# Patient Record
Sex: FEMALE | Race: WHITE | NOT HISPANIC OR LATINO | Employment: UNEMPLOYED | URBAN - METROPOLITAN AREA
[De-identification: names, ages, dates, MRNs, and addresses within clinical notes are randomized per-mention and may not be internally consistent; named-entity substitution may affect disease eponyms.]

---

## 2018-12-20 ENCOUNTER — HISTORICAL (OUTPATIENT)
Dept: INTENSIVE CARE | Facility: HOSPITAL | Age: 23
End: 2018-12-20

## 2019-01-04 ENCOUNTER — HISTORICAL (OUTPATIENT)
Dept: RADIOLOGY | Facility: HOSPITAL | Age: 24
End: 2019-01-04

## 2021-11-10 ENCOUNTER — TELEPHONE (OUTPATIENT)
Dept: PEDIATRIC CARDIOLOGY | Facility: CLINIC | Age: 26
End: 2021-11-10
Payer: MEDICAID

## 2021-11-10 ENCOUNTER — DOCUMENTATION ONLY (OUTPATIENT)
Dept: PEDIATRIC CARDIOLOGY | Facility: CLINIC | Age: 26
End: 2021-11-10

## 2021-11-10 DIAGNOSIS — Z95.2 H/O HEART VALVE REPLACEMENT WITH MECHANICAL VALVE: ICD-10-CM

## 2021-11-10 DIAGNOSIS — I05.0 MITRAL VALVE STENOSIS, UNSPECIFIED ETIOLOGY: Primary | ICD-10-CM

## 2021-11-10 DIAGNOSIS — Z87.74 H/O AORTIC COARCTATION REPAIR: ICD-10-CM

## 2021-11-10 DIAGNOSIS — Q24.9 CONGENITAL MALFORMATION OF HEART, UNSPECIFIED: ICD-10-CM

## 2022-01-26 NOTE — PROGRESS NOTES
2022     re:Carlie Marcelino  :1995    Lindsay Regalado, GIANA  [address] [facility] [address]    [facility]     Dear Dr. Goodson and Ms. Regalado:    Carlie Marcelino is a 26 y.o. female seen in my ACHD clinic today for evaluation of congenital heart disease.  To summarize her diagnoses are as follow:  1. Coarctation of the aorta status post repair 2000  - most recent catheterization 2014  - no significant obstruction noted across coarctation repair  2. Status post resection of a supravalvar mitral ring and Primum atrial septal defect closed 2001  - no residual obstruction within the left atrium, no evidence of residual intracardiac shunting  3. Severe subaortic stenosis treated with a modified Konno procedure May 2004 followed by VSD closure 2004  - no left ventricular outflow obstruction and no significant aortic valve insufficiency  4. Severe mitral valve regurgitation necessitating mitral valve replacement with a 23 mm Saint Keith mechanical valve 2010  - possible mild to moderate stenosis of the mechanical valve with peak and mean gradient across the valve as high as 21 and 12 mmHg, respectively.  - mild to moderate tricuspid insufficiency estimating right ventricular and pulmonary arterial systolic pressure around 45-50 mmHg.  - currently enrolled in [facility]madin Clinic at [facility] [place]  5. Reduced left ventricular systolic function primarily due to VSD patch  - left ventricular ejection fraction around 42% with motion of the myocardium   5. History of wide complex tachycardia/SVT   6. Distant history of seizures, last seen by Neurology at 10 years of age.    7. Pectus carinatum.  All surgeries performed at [facility].      To summarize, my recommendations are as follows:  1. Continue Coumadin with goal INR 2.5-3.5.  2. I have called the [facility] in  [place] to make sure I can continue to have her INR level drawn there if I am the primary cardiologist.  If there are any issues, we can get her transferred over to our Coumadin Clinic, but I am absolutely fine with her continuing care there.  3. Schedule for a diagnostic catheterization.  Specific question to answer is the right ventricular and pulmonary arterial pressure and left ventricular end-diastolic pressure.  We can assess for mitral stenosis.  4. Continue enalapril and metoprolol for now.  5. SBE prophylaxis is definitely indicated.  6. Patient would like to transfer her care to my Adult Congenital Heart Disease Clinic.  I can see her in [place].    Discussion:  1. We discussed her left ventricular function.  Although the ejection fraction is about 42% by my measurement, this is mostly secondary to the expected lack of motion of her ventricular septal defect patch.  The rest of the myocardium moves well.  She is asymptomatic.  There is certainly no indication for a heart transplant at present.  2. I do have concerns about her mitral valve.  The velocity across that valve is higher than expected, and the echo suggests some elevation of her right ventricular and pulmonary arterial pressures.  A catheterization, possibly with a transesophageal echocardiogram although I am not sure this is necessary, should help discern whether there is a significant gradient across that valve.  She could potentially need her mitral valve replaced.  3. Her left ventricular outflow tract, aortic valve, and coarctation looked great.  4. We discussed pregnancy.  I agree that she is a high risk pregnancy given her mechanical valve and high-dose Coumadin.      History of present illness:   She was last seen by Dr. Goodson, who referred her to Adult Congenital Heart Disease Clinic, November 3, 2021. At that time, she was asymptomatic with no further episodes of SVT reported.  She was taking metoprolol 25 mg twice a day,  enalapril 2.5 mg twice a day, and Coumadin.  Her height was 4 ft 10 with weight 102 lb.  Blood pressure was 120/77.  Saturation 99%.  A grade 2/6 systolic ejection murmur was followed by grade 1/6 diastolic murmur.  Femoral pulses were normal.  An EKG revealed normal sinus rhythm with a right bundle branch block and left anterior fascicular block.  An echocardiogram revealed very mild flow acceleration across the aortic arch with peak and mean gradients 14 and 5 mmHg, respectively.  Peak and mean gradient of 22 and 11 across the mitral valve with mild mitral insufficiency.  No evidence of pulmonary hypertension.  No intracardiac shunting.  Left ventricular end-diastolic dimension 5.4 cm with ejection fraction reduced at 40%.  He did note, however, that her transthoracic windows were not optimal, and he thought a cardiac catheterization and possibly a transesophageal echocardiogram would be helpful.  Our congenital cardiac catheterization team, represented by Dr. Funes, recommended an initial visit in my clinic with a cardiac MRI.    She is a very talkative and engaging woman.  She had absolutely no complaints.  She denies chest pain, dyspnea on exertion, shortness of breath, palpitations, syncope, near syncope, cyanosis, and edema.  She is concerned because she was told she may need a heart transplant given her left ventricular function.  She was also told she may need her mitral valve replaced.    The review of systems is as noted above. It is otherwise negative for other symptoms related to the general, neurological, psychiatric, endocrine, gastrointestinal, genitourinary, respiratory, dermatologic, musculoskeletal, hematologic, and immunologic systems.    Past Medical History:   Diagnosis Date    Aortic stenosis     Coarctation of aorta     Mitral valve stenosis      Past Surgical History:   Procedure Laterality Date    CARDIAC SURGERY      MITRAL VALVE REPLACEMENT      REPAIR OF COARCTATION OF AORTA    "    No family history on file.  Social History     Socioeconomic History    Marital status: Single     Current Outpatient Medications on File Prior to Visit   Medication Sig Dispense Refill    enalapril (VASOTEC) 2.5 MG tablet Take by mouth.      JANTOVEN 6 mg tablet Take by mouth.      metoprolol tartrate (LOPRESSOR) 25 MG tablet Take 25 mg by mouth.       Current Facility-Administered Medications on File Prior to Visit   Medication Dose Route Frequency Provider Last Rate Last Admin    [COMPLETED] gadobenate dimeglumine (MULTIHANCE) injection 20 mL  20 mL Intravenous ONCE PRN Aguilar Olguin MD   20 mL at 01/27/22 0956     Review of patient's allergies indicates:  No Known Allergies     Vitals:    01/27/22 1207 01/27/22 1208   BP: (!) 116/58 134/63   BP Location: Right arm Left leg   Pulse: 65    SpO2: 99%    Weight: 47.6 kg (104 lb 15 oz)    Height: 4' 11" (1.499 m)      In general, she is a small, very pleasant, very healthy-appearing nondysmorphic female in no apparent distress.  The eyes, nares, and oropharynx are clear.  Eyelids and conjunctiva are normal without drainage or erythema.  Pupils equal and round bilaterally.  The head is normocephalic and atraumatic.  The neck is supple without jugular venous distention or thyroid enlargement.  The lungs are clear to auscultation bilaterally.  There is a well-healed sternotomy incision.  A mechanical 1st heart sound is normal.  The 2nd heart sound is split.  A grade 1/6 systolic ejection murmur is heard at the upper sternal border.  I do hear a grade 1-2/6 blowing diastolic murmur at the apex.  The abdominal exam is benign without hepatosplenomegaly, tenderness, or distention.  Pulses are normal in all 4 extremities with brisk capillary refill and no clubbing, cyanosis, or edema.  No rashes are noted.    I personally reviewed the following tests performed today and my interpretation follows:  An EKG performed in clinic today reveals a superior leftward axis. "  Sinus rhythm noted.  Right bundle branch block present with QRS duration about 150 milliseconds.    The official echo report is pending.  I reviewed that study in detail.  My interpretation is as follows:  1. Left ventricular ejection fraction around 42%.  However, when I measure the ejection fraction in views not including the VSD patch, I get about 60%.  2. Unobstructive left ventricular outflow tract.  No aortic root enlargement.  No significant aortic insufficiency.  3. Mechanical mitral valve in place.  No significant insufficiency.  Peak velocity about 2.3 m/sec with peak and mean gradients 21 and 12 mmHg, respectively.  4. Mild to moderate somewhat central jet of tricuspid insufficiency estimates the right ventricular systolic pressure about 39 mmHg greater than the right atrial pressure.  5. Normal right ventricular function.  6. Well repaired coarctation.  Mild flow acceleration without discrete narrowing noted across the coarctation with peak and mean gradients 22 and 12 mmHg, respectively.  No diastolic runoff.    The cardiac MRI is pending.  By report, the coarctation site looks good.      Thank you for referring this patient to our clinic.  Please call with any questions.    Sincerely,        Aguilar Olguin MD  Pediatric Cardiology  Adult Congenital Heart Disease  Pediatric Heart Failure and Transplantation  [facility] [address] [place]  [address]  [phone]

## 2022-01-27 ENCOUNTER — OFFICE VISIT (OUTPATIENT)
Dept: CARDIOLOGY | Facility: CLINIC | Age: 27
End: 2022-01-27
Payer: MEDICAID

## 2022-01-27 ENCOUNTER — HOSPITAL ENCOUNTER (OUTPATIENT)
Dept: CARDIOLOGY | Facility: HOSPITAL | Age: 27
Discharge: HOME | End: 2022-01-27
Attending: PEDIATRICS
Payer: MEDICAID

## 2022-01-27 ENCOUNTER — HOSPITAL ENCOUNTER (OUTPATIENT)
Dept: CARDIOLOGY | Facility: CLINIC | Age: 27
Discharge: HOME | End: 2022-01-27
Payer: MEDICAID

## 2022-01-27 ENCOUNTER — HOSPITAL ENCOUNTER (OUTPATIENT)
Dept: RADIOLOGY | Facility: HOSPITAL | Age: 27
Discharge: HOME | End: 2022-01-27
Attending: PEDIATRICS
Payer: MEDICAID

## 2022-01-27 VITALS
HEART RATE: 65 BPM | DIASTOLIC BLOOD PRESSURE: 63 MMHG | SYSTOLIC BLOOD PRESSURE: 134 MMHG | OXYGEN SATURATION: 99 % | HEIGHT: 59 IN | WEIGHT: 104.94 LBS | BODY MASS INDEX: 21.16 KG/M2

## 2022-01-27 VITALS — WEIGHT: 105 LBS | HEIGHT: 59 IN | BODY MASS INDEX: 21.17 KG/M2

## 2022-01-27 DIAGNOSIS — Z95.2 H/O HEART VALVE REPLACEMENT WITH MECHANICAL VALVE: ICD-10-CM

## 2022-01-27 DIAGNOSIS — Q24.9 CONGENITAL MALFORMATION OF HEART, UNSPECIFIED: ICD-10-CM

## 2022-01-27 DIAGNOSIS — I05.0 MITRAL VALVE STENOSIS, UNSPECIFIED ETIOLOGY: ICD-10-CM

## 2022-01-27 DIAGNOSIS — Z87.74 H/O AORTIC COARCTATION REPAIR: ICD-10-CM

## 2022-01-27 DIAGNOSIS — I50.22 CHRONIC SYSTOLIC HEART FAILURE: ICD-10-CM

## 2022-01-27 DIAGNOSIS — Z95.2 H/O MITRAL VALVE REPLACEMENT WITH MECHANICAL VALVE: ICD-10-CM

## 2022-01-27 DIAGNOSIS — Q24.9 ADULT CONGENITAL HEART DISEASE: ICD-10-CM

## 2022-01-27 DIAGNOSIS — Q24.4 SUBAORTIC STENOSIS: ICD-10-CM

## 2022-01-27 PROCEDURE — 25500020 PHARM REV CODE 255: Performed by: PEDIATRICS

## 2022-01-27 PROCEDURE — 93303 ECHO (CUPID ONLY): ICD-10-PCS | Mod: 26,,, | Performed by: PEDIATRICS

## 2022-01-27 PROCEDURE — 3075F PR MOST RECENT SYSTOLIC BLOOD PRESS GE 130-139MM HG: ICD-10-PCS | Mod: CPTII,,, | Performed by: PEDIATRICS

## 2022-01-27 PROCEDURE — 93320 ECHO (CUPID ONLY): ICD-10-PCS | Mod: 26,,, | Performed by: PEDIATRICS

## 2022-01-27 PROCEDURE — 99999 PR PBB SHADOW E&M-EST. PATIENT-LVL III: ICD-10-PCS | Mod: PBBFAC,,, | Performed by: PEDIATRICS

## 2022-01-27 PROCEDURE — 1159F PR MEDICATION LIST DOCUMENTED IN MEDICAL RECORD: ICD-10-PCS | Mod: CPTII,,, | Performed by: PEDIATRICS

## 2022-01-27 PROCEDURE — 93010 ELECTROCARDIOGRAM REPORT: CPT | Mod: S$PBB,,, | Performed by: INTERNAL MEDICINE

## 2022-01-27 PROCEDURE — A9577 INJ MULTIHANCE: HCPCS | Performed by: PEDIATRICS

## 2022-01-27 PROCEDURE — 99999 PR PBB SHADOW E&M-EST. PATIENT-LVL III: CPT | Mod: PBBFAC,,, | Performed by: PEDIATRICS

## 2022-01-27 PROCEDURE — 3008F BODY MASS INDEX DOCD: CPT | Mod: CPTII,,, | Performed by: PEDIATRICS

## 2022-01-27 PROCEDURE — 93303 ECHO TRANSTHORACIC: CPT | Mod: 26,,, | Performed by: PEDIATRICS

## 2022-01-27 PROCEDURE — 1159F MED LIST DOCD IN RCRD: CPT | Mod: CPTII,,, | Performed by: PEDIATRICS

## 2022-01-27 PROCEDURE — 3078F PR MOST RECENT DIASTOLIC BLOOD PRESSURE < 80 MM HG: ICD-10-PCS | Mod: CPTII,,, | Performed by: PEDIATRICS

## 2022-01-27 PROCEDURE — 93325 DOPPLER ECHO COLOR FLOW MAPG: CPT

## 2022-01-27 PROCEDURE — 93320 DOPPLER ECHO COMPLETE: CPT | Mod: 26,,, | Performed by: PEDIATRICS

## 2022-01-27 PROCEDURE — 99205 PR OFFICE/OUTPT VISIT, NEW, LEVL V, 60-74 MIN: ICD-10-PCS | Mod: 25,S$PBB,, | Performed by: PEDIATRICS

## 2022-01-27 PROCEDURE — 93325 ECHO (CUPID ONLY): ICD-10-PCS | Mod: 26,,, | Performed by: PEDIATRICS

## 2022-01-27 PROCEDURE — 3008F PR BODY MASS INDEX (BMI) DOCUMENTED: ICD-10-PCS | Mod: CPTII,,, | Performed by: PEDIATRICS

## 2022-01-27 PROCEDURE — 75561 CV CARDIAC MRI MORPHOLOGY (CUPID ONLY): ICD-10-PCS | Mod: 26,,, | Performed by: PEDIATRICS

## 2022-01-27 PROCEDURE — 75561 CARDIAC MRI FOR MORPH W/DYE: CPT | Mod: 26,,, | Performed by: PEDIATRICS

## 2022-01-27 PROCEDURE — 75561 MRI CARDIAC MORPHOLOGY FUNCTION W WO: ICD-10-PCS | Mod: 26,,, | Performed by: INTERNAL MEDICINE

## 2022-01-27 PROCEDURE — 93010 EKG 12-LEAD: ICD-10-PCS | Mod: S$PBB,,, | Performed by: INTERNAL MEDICINE

## 2022-01-27 PROCEDURE — 3078F DIAST BP <80 MM HG: CPT | Mod: CPTII,,, | Performed by: PEDIATRICS

## 2022-01-27 PROCEDURE — 93005 ELECTROCARDIOGRAM TRACING: CPT | Mod: PBBFAC | Performed by: INTERNAL MEDICINE

## 2022-01-27 PROCEDURE — 3075F SYST BP GE 130 - 139MM HG: CPT | Mod: CPTII,,, | Performed by: PEDIATRICS

## 2022-01-27 PROCEDURE — 99205 OFFICE O/P NEW HI 60 MIN: CPT | Mod: 25,S$PBB,, | Performed by: PEDIATRICS

## 2022-01-27 PROCEDURE — 75561 CARDIAC MRI FOR MORPH W/DYE: CPT | Mod: 26,,, | Performed by: INTERNAL MEDICINE

## 2022-01-27 PROCEDURE — 75561 CARDIAC MRI FOR MORPH W/DYE: CPT | Mod: TC

## 2022-01-27 PROCEDURE — 93325 DOPPLER ECHO COLOR FLOW MAPG: CPT | Mod: 26,,, | Performed by: PEDIATRICS

## 2022-01-27 PROCEDURE — 99213 OFFICE O/P EST LOW 20 MIN: CPT | Mod: PBBFAC,25 | Performed by: PEDIATRICS

## 2022-01-27 RX ORDER — ENALAPRIL MALEATE 2.5 MG/1
TABLET ORAL
COMMUNITY
Start: 2021-08-26 | End: 2022-03-10

## 2022-01-27 RX ORDER — WARFARIN SODIUM 6 MG/1
TABLET ORAL DAILY
COMMUNITY
Start: 2022-01-12 | End: 2024-02-07

## 2022-01-27 RX ORDER — METOPROLOL TARTRATE 25 MG/1
25 TABLET, FILM COATED ORAL
COMMUNITY
End: 2022-12-28 | Stop reason: SDUPTHER

## 2022-01-27 RX ADMIN — GADOBENATE DIMEGLUMINE 20 ML: 529 INJECTION, SOLUTION INTRAVENOUS at 09:01

## 2022-01-31 LAB
ASCENDING AORTA: 2.59 CM
AV INDEX (PROSTH): 0.56
AV MEAN GRADIENT: 11 MMHG
AV PEAK GRADIENT: 19 MMHG
AV VALVE AREA: 2.19 CM2
AV VELOCITY RATIO: 0.55
BSA FOR ECHO PROCEDURE: 1.41 M2
CV ECHO LV RWT: 0.29 CM
DOP CALC AO PEAK VEL: 2.17 M/S
DOP CALC AO VTI: 45.97 CM
DOP CALC LVOT AREA: 3.9 CM2
DOP CALC LVOT DIAMETER: 2.22 CM
DOP CALC LVOT PEAK VEL: 1.19 M/S
DOP CALC LVOT STROKE VOLUME: 100.47 CM3
DOP CALC MV VTI: 72.6 CM
DOP CALC RVOT PEAK VEL: 0.79 M/S
DOP CALC RVOT VTI: 18.31 CM
DOP CALCLVOT PEAK VEL VTI: 25.97 CM
ECHO LV POSTERIOR WALL: 0.73 CM (ref 0.6–1.1)
EJECTION FRACTION: 45 %
FRACTIONAL SHORTENING: 17 % (ref 28–44)
INTERVENTRICULAR SEPTUM: 0.81 CM (ref 0.6–1.1)
LA MAJOR: 4.23 CM
LA MINOR: 4.91 CM
LA WIDTH: 3.83 CM
LEFT ATRIUM SIZE: 3.44 CM
LEFT ATRIUM VOLUME INDEX MOD: 32 ML/M2
LEFT ATRIUM VOLUME INDEX: 36.4 ML/M2
LEFT ATRIUM VOLUME MOD: 44.76 CM3
LEFT ATRIUM VOLUME: 50.9 CM3
LEFT INTERNAL DIMENSION IN SYSTOLE: 4.23 CM (ref 2.1–4)
LEFT VENTRICLE DIASTOLIC VOLUME INDEX: 87.51 ML/M2
LEFT VENTRICLE DIASTOLIC VOLUME: 122.52 ML
LEFT VENTRICLE MASS INDEX: 95 G/M2
LEFT VENTRICLE SYSTOLIC VOLUME INDEX: 57.1 ML/M2
LEFT VENTRICLE SYSTOLIC VOLUME: 79.87 ML
LEFT VENTRICULAR INTERNAL DIMENSION IN DIASTOLE: 5.08 CM (ref 3.5–6)
LEFT VENTRICULAR MASS: 132.91 G
MV MEAN GRADIENT: 2 MMHG
MV PEAK GRADIENT: 21 MMHG
MV VALVE AREA BY CONTINUITY EQUATION: 1.38 CM2
PISA TR MAX VEL: 3.12 M/S
PV MEAN GRADIENT: 1.53 MMHG
PV PEAK VELOCITY: 1 CM/S
RA MAJOR: 3.27 CM
RA WIDTH: 3.7 CM
RIGHT VENTRICULAR END-DIASTOLIC DIMENSION: 4.61 CM
RV TISSUE DOPPLER FREE WALL SYSTOLIC VELOCITY 1 (APICAL 4 CHAMBER VIEW): 5.11 CM/S
SINUS: 2.97 CM
STJ: 2.58 CM
TR MAX PG: 39 MMHG
TRICUSPID ANNULAR PLANE SYSTOLIC EXCURSION: 2.52 CM

## 2022-02-11 ENCOUNTER — TELEPHONE (OUTPATIENT)
Dept: PEDIATRIC CARDIOLOGY | Facility: CLINIC | Age: 27
End: 2022-02-11
Payer: MEDICAID

## 2022-02-11 NOTE — TELEPHONE ENCOUNTER
----- Message from Hira Funes Jr., MD sent at 1/28/2022 12:13 PM CST -----  OK to schedule for cath with sedation by anesthesia team, no LIZZ  Ayden  ----- Message -----  From: Aguilar Olguin MD  Sent: 1/27/2022   1:26 PM CST  To: Hira Funes Jr., MD, #    Can we get her scheduled for a diagnostic catheterization?  Specifically to assess for pulmonary hypertension, stenosis across the mechanical mitral valve.    She was initially referred to Dr. Funes by Barry Goodson for a catheterization and possible transesophageal echo.  He recommended an evaluation with me with an MRI and echo prior to scheduling the cath.  She had that today.  I agree with getting the catheterization.    Ayden - our echo pictures are pretty good.  Barry had mention possibly get a transesophageal echo, but I don't think that is necessary.  Let me know which you think.

## 2022-02-11 NOTE — TELEPHONE ENCOUNTER
Spoke to pt. Regarding scheduling cardiac cath procedure. Agreed to 3/17. Pre procedural instructions given and pt stated understanding. Pt is on Jantoven. Advised may need to hold medication prior to procedure. Procedural instructions sent via email to [email or web address]. Dr. Olguin updated regarding scheduling.

## 2022-02-14 ENCOUNTER — TELEPHONE (OUTPATIENT)
Dept: PEDIATRIC CARDIOLOGY | Facility: CLINIC | Age: 27
End: 2022-02-14
Payer: MEDICAID

## 2022-02-14 NOTE — TELEPHONE ENCOUNTER
Patient with cath scheduled for 3/17/22.  Will check INR on Monday 3/14.  If less than 3, can continue coumadin.  If not, will hold and consider some lovenox to bridge.  Patient and mom notified.  [facility] Cou[facility] informed, and they have scheduled her for testing that morning.

## 2022-03-10 ENCOUNTER — TELEPHONE (OUTPATIENT)
Dept: PEDIATRIC CARDIOLOGY | Facility: CLINIC | Age: 27
End: 2022-03-10
Payer: MEDICAID

## 2022-03-10 NOTE — TELEPHONE ENCOUNTER
----- Message from Lucero Botello RN sent at 3/9/2022 11:11 AM CST -----  Contact: Self [phone]  I believe I sent you this yesterday regarding her cough. What's the plan?    Lucero RAMEY RN   ----- Message -----  From: Janie Torres  Sent: 3/9/2022  10:39 AM CST  To: Sharif JIMENEZ Staff    Patient would like to get medical advice.    Would you like a call back, or a response through your [facility] portal?:   call back or portal    Pharmacy name and phone # (copy from chart): [facility] [address] [place] [address] [phone], but pharmacy is not pulling up in epic    Comments:   Calling to speak with the nurse regarding a status for a medication change in place of enalapril (VASOTEC) 2.5 MG tablet.

## 2022-03-10 NOTE — TELEPHONE ENCOUNTER
I called the patient.  She complains of a cough for the past 2-3 years that is intermittent and worse at night.  She wonders if her medication could be the problem.  She is currently on a tiny dose of enalapril.  Her blood pressure was fine in clinic.  We will stop the enalapril and see if her cough improves.  She is scheduled for a cardiac catheterization in 1 week.  We can reassess the need for an angiotensin receptor blocker after that catheterization.

## 2022-03-15 ENCOUNTER — TELEPHONE (OUTPATIENT)
Dept: PEDIATRIC CARDIOLOGY | Facility: CLINIC | Age: 27
End: 2022-03-15
Payer: MEDICAID

## 2022-03-15 NOTE — TELEPHONE ENCOUNTER
----- Message from Yudy Bai RN sent at 3/14/2022 12:52 PM CDT -----  Contact: Claire burris calling [phone]  This is a message from the coumadin clinic.  Please advise.  Not sure what I can offer.    Yudy  ----- Message -----  From: Niurka Garvey  Sent: 3/14/2022  12:21 PM CDT  To: Sharif Rangel calling to speak with the nurse to report a INR level of 2.6. Please call to advise.

## 2022-03-15 NOTE — TELEPHONE ENCOUNTER
Her local coumadin clinic called me.  INR yesterday (3/14/22) was 2.6.  Our plan was to continue coumadin through the cath provided INR less than 3.

## 2022-03-16 ENCOUNTER — TELEPHONE (OUTPATIENT)
Dept: PEDIATRIC CARDIOLOGY | Facility: CLINIC | Age: 27
End: 2022-03-16
Payer: MEDICAID

## 2022-03-16 NOTE — TELEPHONE ENCOUNTER
Spoke to pt regarding COVID swab- advised re test in need tomorrow morning. Pt stated understanding. Will re-do home swab tomorrow morning.

## 2022-03-16 NOTE — TELEPHONE ENCOUNTER
----- Message from Lucero Botello RN sent at 3/16/2022 10:07 AM CDT -----  Contact: Pt @[phone]  Well she took the test today ( home test and she took a picture of it ) ..looks like her procedure is tomorrow.     Lucero RAMEY RN   ----- Message -----  From: Vibha Null  Sent: 3/16/2022   9:32 AM CDT  To: Sharif JIMENEZ Staff    Patient would like to get medical advice.  Covid results   Would you like a call back, or a response through your [facility] portal?:    call back     Comments:     Results are Negative

## 2022-03-16 NOTE — TELEPHONE ENCOUNTER
Left VM for patient to inform her of procedure delay for tomorrow with new arrival time of 10AM. Reviewed NPO guidelines. Instructed to call back if any questions.

## 2022-03-17 ENCOUNTER — ANESTHESIA EVENT (OUTPATIENT)
Dept: MEDSURG UNIT | Facility: HOSPITAL | Age: 27
End: 2022-03-17
Payer: MEDICAID

## 2022-03-17 ENCOUNTER — ANESTHESIA (OUTPATIENT)
Dept: MEDSURG UNIT | Facility: HOSPITAL | Age: 27
End: 2022-03-17
Payer: MEDICAID

## 2022-03-17 ENCOUNTER — HOSPITAL ENCOUNTER (OUTPATIENT)
Facility: HOSPITAL | Age: 27
Discharge: HOME | End: 2022-03-17
Attending: PEDIATRICS | Admitting: PEDIATRICS
Payer: MEDICAID

## 2022-03-17 VITALS
WEIGHT: 105 LBS | HEART RATE: 70 BPM | BODY MASS INDEX: 22.04 KG/M2 | DIASTOLIC BLOOD PRESSURE: 62 MMHG | SYSTOLIC BLOOD PRESSURE: 103 MMHG | HEIGHT: 58 IN | RESPIRATION RATE: 16 BRPM | OXYGEN SATURATION: 99 % | TEMPERATURE: 98 F

## 2022-03-17 DIAGNOSIS — Q24.9 ADULT CONGENITAL HEART DISEASE: ICD-10-CM

## 2022-03-17 DIAGNOSIS — Z87.74 HISTORY OF COARCTATION OF AORTA: ICD-10-CM

## 2022-03-17 DIAGNOSIS — Z95.2 H/O MITRAL VALVE REPLACEMENT WITH MECHANICAL VALVE: ICD-10-CM

## 2022-03-17 DIAGNOSIS — Q24.4 SUBAORTIC STENOSIS: ICD-10-CM

## 2022-03-17 DIAGNOSIS — Z87.74 H/O AORTIC COARCTATION REPAIR: Primary | ICD-10-CM

## 2022-03-17 DIAGNOSIS — Q25.1 COARCTATION OF AORTA: ICD-10-CM

## 2022-03-17 LAB
ABO + RH BLD: NORMAL
ANION GAP SERPL CALC-SCNC: 10 MMOL/L (ref 8–16)
APTT BLDCRRT: 41 SEC (ref 21–32)
B-HCG UR QL: NEGATIVE
BASOPHILS # BLD AUTO: 0.02 K/UL (ref 0–0.2)
BASOPHILS NFR BLD: 0.4 % (ref 0–1.9)
BLD GP AB SCN CELLS X3 SERPL QL: NORMAL
BSA FOR ECHO PROCEDURE: 1.4 M2
BUN SERPL-MCNC: 10 MG/DL (ref 6–20)
CALCIUM SERPL-MCNC: 10.2 MG/DL (ref 8.7–10.5)
CHLORIDE SERPL-SCNC: 104 MMOL/L (ref 95–110)
CO2 SERPL-SCNC: 25 MMOL/L (ref 23–29)
CREAT SERPL-MCNC: 0.7 MG/DL (ref 0.5–1.4)
CTP QC/QA: YES
DIFFERENTIAL METHOD: ABNORMAL
EOSINOPHIL # BLD AUTO: 0.1 K/UL (ref 0–0.5)
EOSINOPHIL NFR BLD: 1.4 % (ref 0–8)
ERYTHROCYTE [DISTWIDTH] IN BLOOD BY AUTOMATED COUNT: 12.7 % (ref 11.5–14.5)
EST. GFR  (AFRICAN AMERICAN): >60 ML/MIN/1.73 M^2
EST. GFR  (NON AFRICAN AMERICAN): >60 ML/MIN/1.73 M^2
GLUCOSE SERPL-MCNC: 89 MG/DL (ref 70–110)
HCT VFR BLD AUTO: 41.3 % (ref 37–48.5)
HGB BLD-MCNC: 13.2 G/DL (ref 12–16)
IMM GRANULOCYTES # BLD AUTO: 0.02 K/UL (ref 0–0.04)
IMM GRANULOCYTES NFR BLD AUTO: 0.4 % (ref 0–0.5)
INR PPP: 3.3 (ref 0.8–1.2)
LYMPHOCYTES # BLD AUTO: 0.9 K/UL (ref 1–4.8)
LYMPHOCYTES NFR BLD: 16.6 % (ref 18–48)
MCH RBC QN AUTO: 29.5 PG (ref 27–31)
MCHC RBC AUTO-ENTMCNC: 32 G/DL (ref 32–36)
MCV RBC AUTO: 92 FL (ref 82–98)
MONOCYTES # BLD AUTO: 0.5 K/UL (ref 0.3–1)
MONOCYTES NFR BLD: 8.1 % (ref 4–15)
NEUTROPHILS # BLD AUTO: 4.2 K/UL (ref 1.8–7.7)
NEUTROPHILS NFR BLD: 73.1 % (ref 38–73)
NRBC BLD-RTO: 0 /100 WBC
PLATELET # BLD AUTO: 221 K/UL (ref 150–450)
PMV BLD AUTO: 12.1 FL (ref 9.2–12.9)
POTASSIUM SERPL-SCNC: 4.1 MMOL/L (ref 3.5–5.1)
PROTHROMBIN TIME: 32.3 SEC (ref 9–12.5)
RBC # BLD AUTO: 4.48 M/UL (ref 4–5.4)
SODIUM SERPL-SCNC: 139 MMOL/L (ref 136–145)
WBC # BLD AUTO: 5.67 K/UL (ref 3.9–12.7)

## 2022-03-17 PROCEDURE — 93596 PR RT & LT HEART CATH W/IMG GUID, NORMAL NATIVE CONNECT: ICD-10-PCS | Mod: 26,22,, | Performed by: PEDIATRICS

## 2022-03-17 PROCEDURE — 81025 URINE PREGNANCY TEST: CPT | Performed by: PEDIATRICS

## 2022-03-17 PROCEDURE — 75710 ARTERY X-RAYS ARM/LEG: CPT | Mod: 22 | Performed by: PEDIATRICS

## 2022-03-17 PROCEDURE — 75710 PR  ANGIO EXTREMITY UNILAT: ICD-10-PCS | Mod: 26,59,22, | Performed by: PEDIATRICS

## 2022-03-17 PROCEDURE — 93596 R&L HRT CATH CHD NML NT CNJ: CPT | Performed by: PEDIATRICS

## 2022-03-17 PROCEDURE — D9220A PRA ANESTHESIA: ICD-10-PCS | Mod: ANES,,, | Performed by: ANESTHESIOLOGY

## 2022-03-17 PROCEDURE — 36216 PLACE CATHETER IN ARTERY: CPT | Performed by: PEDIATRICS

## 2022-03-17 PROCEDURE — 01916 ANES DX ARTERIOGRAPHY/VNGRPH: CPT | Performed by: PEDIATRICS

## 2022-03-17 PROCEDURE — C1887 CATHETER, GUIDING: HCPCS | Performed by: PEDIATRICS

## 2022-03-17 PROCEDURE — D9220A PRA ANESTHESIA: Mod: ANES,,, | Performed by: ANESTHESIOLOGY

## 2022-03-17 PROCEDURE — 37000009 HC ANESTHESIA EA ADD 15 MINS: Performed by: PEDIATRICS

## 2022-03-17 PROCEDURE — 36216 PR PLACE CATH SUBSELECT ART,NECK: ICD-10-PCS | Mod: 22,,, | Performed by: PEDIATRICS

## 2022-03-17 PROCEDURE — D9220A PRA ANESTHESIA: Mod: CRNA,,, | Performed by: NURSE ANESTHETIST, CERTIFIED REGISTERED

## 2022-03-17 PROCEDURE — 75710 ARTERY X-RAYS ARM/LEG: CPT | Mod: 26,59,22, | Performed by: PEDIATRICS

## 2022-03-17 PROCEDURE — 99499 NO LOS: ICD-10-PCS | Mod: ,,, | Performed by: PEDIATRICS

## 2022-03-17 PROCEDURE — 85610 PROTHROMBIN TIME: CPT | Performed by: PEDIATRICS

## 2022-03-17 PROCEDURE — C1894 INTRO/SHEATH, NON-LASER: HCPCS | Performed by: PEDIATRICS

## 2022-03-17 PROCEDURE — 63600175 PHARM REV CODE 636 W HCPCS: Performed by: NURSE ANESTHETIST, CERTIFIED REGISTERED

## 2022-03-17 PROCEDURE — 37000008 HC ANESTHESIA 1ST 15 MINUTES: Performed by: PEDIATRICS

## 2022-03-17 PROCEDURE — C1751 CATH, INF, PER/CENT/MIDLINE: HCPCS | Performed by: PEDIATRICS

## 2022-03-17 PROCEDURE — 99499 UNLISTED E&M SERVICE: CPT | Mod: ,,, | Performed by: PEDIATRICS

## 2022-03-17 PROCEDURE — 93596 R&L HRT CATH CHD NML NT CNJ: CPT | Mod: 26,22,, | Performed by: PEDIATRICS

## 2022-03-17 PROCEDURE — 85025 COMPLETE CBC W/AUTO DIFF WBC: CPT | Performed by: PEDIATRICS

## 2022-03-17 PROCEDURE — 36216 PLACE CATHETER IN ARTERY: CPT | Mod: 22,,, | Performed by: PEDIATRICS

## 2022-03-17 PROCEDURE — 85730 THROMBOPLASTIN TIME PARTIAL: CPT | Performed by: PEDIATRICS

## 2022-03-17 PROCEDURE — 80048 BASIC METABOLIC PNL TOTAL CA: CPT | Performed by: PEDIATRICS

## 2022-03-17 PROCEDURE — 25500020 PHARM REV CODE 255: Performed by: PEDIATRICS

## 2022-03-17 PROCEDURE — 86901 BLOOD TYPING SEROLOGIC RH(D): CPT | Performed by: PEDIATRICS

## 2022-03-17 PROCEDURE — D9220A PRA ANESTHESIA: ICD-10-PCS | Mod: CRNA,,, | Performed by: NURSE ANESTHETIST, CERTIFIED REGISTERED

## 2022-03-17 PROCEDURE — 25000003 PHARM REV CODE 250: Performed by: NURSE ANESTHETIST, CERTIFIED REGISTERED

## 2022-03-17 PROCEDURE — C1769 GUIDE WIRE: HCPCS | Performed by: PEDIATRICS

## 2022-03-17 RX ORDER — MIDAZOLAM HYDROCHLORIDE 1 MG/ML
INJECTION, SOLUTION INTRAMUSCULAR; INTRAVENOUS
Status: DISCONTINUED | OUTPATIENT
Start: 2022-03-17 | End: 2022-03-17

## 2022-03-17 RX ORDER — HEPARIN SODIUM 5000 [USP'U]/ML
INJECTION, SOLUTION INTRAVENOUS; SUBCUTANEOUS
Status: DISCONTINUED | OUTPATIENT
Start: 2022-03-17 | End: 2022-03-17

## 2022-03-17 RX ORDER — DEXMEDETOMIDINE HYDROCHLORIDE 100 UG/ML
INJECTION, SOLUTION INTRAVENOUS
Status: DISCONTINUED | OUTPATIENT
Start: 2022-03-17 | End: 2022-03-17

## 2022-03-17 RX ORDER — SODIUM CHLORIDE 9 MG/ML
INJECTION, SOLUTION INTRAVENOUS CONTINUOUS
Status: ACTIVE | OUTPATIENT
Start: 2022-03-17 | End: 2022-03-17

## 2022-03-17 RX ORDER — FENTANYL CITRATE 50 UG/ML
INJECTION, SOLUTION INTRAMUSCULAR; INTRAVENOUS
Status: DISCONTINUED | OUTPATIENT
Start: 2022-03-17 | End: 2022-03-17

## 2022-03-17 RX ORDER — SODIUM CHLORIDE 0.9 % (FLUSH) 0.9 %
10 SYRINGE (ML) INJECTION
Status: CANCELLED | OUTPATIENT
Start: 2022-03-17

## 2022-03-17 RX ADMIN — FENTANYL CITRATE 50 MCG: 50 INJECTION, SOLUTION INTRAMUSCULAR; INTRAVENOUS at 02:03

## 2022-03-17 RX ADMIN — HEPARIN SODIUM 5000 UNITS: 5000 INJECTION INTRAVENOUS; SUBCUTANEOUS at 03:03

## 2022-03-17 RX ADMIN — MIDAZOLAM HYDROCHLORIDE 2 MG: 1 INJECTION, SOLUTION INTRAMUSCULAR; INTRAVENOUS at 03:03

## 2022-03-17 RX ADMIN — MIDAZOLAM HYDROCHLORIDE 2 MG: 1 INJECTION, SOLUTION INTRAMUSCULAR; INTRAVENOUS at 02:03

## 2022-03-17 RX ADMIN — DEXMEDETOMIDINE HYDROCHLORIDE 8 MCG: 100 INJECTION, SOLUTION, CONCENTRATE INTRAVENOUS at 03:03

## 2022-03-17 RX ADMIN — SODIUM CHLORIDE: 0.9 INJECTION, SOLUTION INTRAVENOUS at 02:03

## 2022-03-17 RX ADMIN — DEXMEDETOMIDINE HYDROCHLORIDE 12 MCG: 100 INJECTION, SOLUTION, CONCENTRATE INTRAVENOUS at 03:03

## 2022-03-17 NOTE — NURSING
IV d/c'd with cath tip intact.  Pt and pt mother verbalized an understanding of d/c instructions.  Off unit via wheelchair for discharge home.

## 2022-03-17 NOTE — SUBJECTIVE & OBJECTIVE
Past Medical History:   Diagnosis Date    Aortic stenosis     Coarctation of aorta     Mitral valve stenosis        Past Surgical History:   Procedure Laterality Date    CARDIAC SURGERY      MITRAL VALVE REPLACEMENT      REPAIR OF COARCTATION OF AORTA         Review of patient's allergies indicates:  No Known Allergies    No current facility-administered medications on file prior to encounter.     Current Outpatient Medications on File Prior to Encounter   Medication Sig    JANTOVEN 6 mg tablet Take by mouth.    metoprolol tartrate (LOPRESSOR) 25 MG tablet Take 25 mg by mouth.     Family History    None       Social History     Social History Narrative    Not on file     Review of Systems   Constitutional:         Exercise intolerance   HENT: Negative.     Eyes: Negative.    Respiratory: Negative.     Cardiovascular: Negative.    Gastrointestinal: Negative.    Endocrine: Negative.    Genitourinary: Negative.    Musculoskeletal: Negative.    Allergic/Immunologic: Negative.    Neurological: Negative.    Hematological: Negative.    Psychiatric/Behavioral: Negative.     Objective:     Vital Signs (Most Recent):  Temp: 97.8 °F (36.6 °C) (03/17/22 0900)  Pulse: 77 (03/17/22 0900)  Resp: 16 (03/17/22 0900)  BP: 117/66 (03/17/22 0900)  SpO2: 98 % (03/17/22 0900) Vital Signs (24h Range):  Temp:  [97.8 °F (36.6 °C)] 97.8 °F (36.6 °C)  Pulse:  [77] 77  Resp:  [16] 16  SpO2:  [98 %] 98 %  BP: (117)/(66) 117/66     Weight: 47.6 kg (105 lb)  Body mass index is 21.95 kg/m².    SpO2: 98 %       No intake or output data in the 24 hours ending 03/17/22 1402    Lines/Drains/Airways       Peripheral Intravenous Line  Duration                  Peripheral IV - Single Lumen 03/17/22 0952 20 G Left Forearm <1 day         Peripheral IV - Single Lumen 03/17/22 1008 20 G Right Antecubital <1 day                    Physical Exam  Vitals and nursing note reviewed. Exam conducted with a chaperone present.   Constitutional:       General: She is  not in acute distress.     Appearance: She is well-developed. She is not diaphoretic.   HENT:      Head: Normocephalic and atraumatic.      Right Ear: External ear normal.      Left Ear: External ear normal.      Nose: Nose normal.   Eyes:      General: No scleral icterus.        Right eye: No discharge.         Left eye: No discharge.      Conjunctiva/sclera: Conjunctivae normal.      Pupils: Pupils are equal, round, and reactive to light.   Neck:      Thyroid: No thyromegaly.   Cardiovascular:      Rate and Rhythm: Normal rate and regular rhythm.      Heart sounds: Murmur heard.     No friction rub. No gallop.   Pulmonary:      Effort: Pulmonary effort is normal. No respiratory distress.      Breath sounds: Normal breath sounds. No wheezing or rales.   Chest:      Chest wall: No tenderness.   Abdominal:      General: Bowel sounds are normal. There is no distension.      Palpations: Abdomen is soft. There is no mass.      Tenderness: There is no abdominal tenderness. There is no guarding or rebound.      Hernia: No hernia is present.   Musculoskeletal:         General: No tenderness or deformity. Normal range of motion.      Cervical back: Normal range of motion and neck supple.   Skin:     General: Skin is warm.   Neurological:      Mental Status: She is alert and oriented to person, place, and time.      Cranial Nerves: No cranial nerve deficit.      Motor: No abnormal muscle tone.      Coordination: Coordination normal.   Psychiatric:         Behavior: Behavior normal.         Thought Content: Thought content normal.         Judgment: Judgment normal.       Significant Labs: None    Significant Imaging:  Studies reviewed

## 2022-03-17 NOTE — ASSESSMENT & PLAN NOTE
Diagnostic catheterization.  Specific question to answer is the right ventricular and pulmonary arterial pressure and left ventricular end-diastolic pressure.  We can assess for mitral stenosis.  Plan right radial and brachial access (INR 3.3)  Consent reviewed in detail, SOC

## 2022-03-17 NOTE — ANESTHESIA PREPROCEDURE EVALUATION
03/17/2022  Carlie Marcelino is a 26 y.o., female. To summarize her diagnoses are as follow:  1. Coarctation of the aorta status post repair November 2000  - most recent catheterization August 2014  - no significant obstruction noted across coarctation repair  2. Status post resection of a supravalvar mitral ring and Primum atrial septal defect closed January 2001  - no residual obstruction within the left atrium, no evidence of residual intracardiac shunting  3. Severe subaortic stenosis treated with a modified Konno procedure May 2004 followed by VSD closure September 2004  - no left ventricular outflow obstruction and no significant aortic valve insufficiency  4. Severe mitral valve regurgitation necessitating mitral valve replacement with a 23 mm Saint Keith mechanical valve June 2010  - possible mild to moderate stenosis of the mechanical valve with peak and mean gradient across the valve as high as 21 and 12 mmHg, respectively.  - mild to moderate tricuspid insufficiency estimating right ventricular and pulmonary arterial systolic pressure around 45-50 mmHg.  - currently enrolled in Coumadin Clinic at [facility] in [place]  5. Reduced left ventricular systolic function primarily due to VSD patch  - left ventricular ejection fraction around 42% with motion of the myocardium   5. History of wide complex tachycardia/SVT 2020  6. Distant history of seizures, last seen by Neurology at 10 years of age.    7. Pectus carinatum.  [facility] at [facility].        Pre-op Assessment    I have reviewed the Patient Summary Reports.     I have reviewed the Nursing Notes. I have reviewed the NPO Status.   I have reviewed the Medications.     Review of Systems  Anesthesia Hx:  No problems with previous Anesthesia Denies Hx of Anesthetic complications  Denies Family Hx of  Anesthesia complications.   Denies Personal Hx of Anesthesia complications.   Social:  No Alcohol Use, Non-Smoker    Hematology/Oncology:  Hematology Normal   Oncology Normal     EENT/Dental:EENT/Dental Normal   Cardiovascular:   ECG has been reviewed. 1. Coarctation of the aorta status post repair November 2000  - most recent catheterization August 2014  - no significant obstruction noted across coarctation repair  2. Status post resection of a supravalvar mitral ring and Primum atrial septal defect closed January 2001  - no residual obstruction within the left atrium, no evidence of residual intracardiac shunting  3. Severe subaortic stenosis treated with a modified Konno procedure May 2004 followed by VSD closure September 2004  - no left ventricular outflow obstruction and no significant aortic valve insufficiency  4. Severe mitral valve regurgitation necessitating mitral valve replacement with a 23 mm Saint Keith mechanical valve June 2010  - possible mild to moderate stenosis of the mechanical valve with peak and mean gradient across the valve as high as 21 and 12 mmHg, respectively.  - mild to moderate tricuspid insufficiency estimating right ventricular and pulmonary arterial systolic pressure around 45-50 mmHg.   Pulmonary:  Pulmonary Normal        Physical Exam  General: Well nourished, Cooperative, Alert and Oriented    Airway:  Mallampati: II / I  Mouth Opening: Normal  TM Distance: Normal  Tongue: Normal  Neck ROM: Normal ROM    Dental:  Intact    Chest/Lungs:  Clear to auscultation, Normal Respiratory Rate    Heart:  Rate: Normal  Rhythm: Regular Rhythm  Sounds: Normal        Anesthesia Plan  Type of Anesthesia, risks & benefits discussed:    Anesthesia Type: Gen ETT  Intra-op Monitoring Plan: Standard ASA Monitors  Post Op Pain Control Plan: multimodal analgesia and IV/PO Opioids PRN  Induction:  Inhalation  Airway Plan: Direct  Informed Consent: Informed consent signed with the Patient and all parties  understand the risks and agree with anesthesia plan.  All questions answered. Patient consented to blood products? Yes  ASA Score: 3  Day of Surgery Review of History & Physical: H&P Update referred to the surgeon/provider.    Ready For Surgery From Anesthesia Perspective.     .

## 2022-03-17 NOTE — NURSING
Received via stretcher from cath lab.  Report from cath lab RN.  Sleepy but arousable.  vasc band in place to right wrist intact no bleeding or hematoma noted.  VSS.  Tolerating sips of water on arrival.  Will continue to monitor.  Mother called to bedside.

## 2022-03-17 NOTE — DISCHARGE INSTRUCTIONS
Cath discharge instructions:  1. Do not strain or lift anything greater than 4 lbs to wrist that was accessed.  2. Do not drive or operate any dangerous machinery for 24 hours.  3. Keep the dressing on, clean, and dry for 24 hours.  4. After 24 hours, the dressing may be removed and a shower is allowed.  5. Clean the area with mild soap and water and then cover it with a bandage.  6. Once the skin has healed, bathing in a tub or swimming is allowed.  7. Inspect the access site daily and report to the physician any swelling at the site that  cannot be controlled with manual pressure for 10 minutes, unusual pain at the  access site or affected extremity, unusual swelling at the access site, or signs or  symptoms of infection such as redness, pain, or fever.  Call 911 if you have:  Bleeding from the puncture site that you cannot stop by doing the following:  Keep your wrist straight and apply firm pressure to the site using your fingers and a gauze pad. Keep the pressure on for 20 minutes. Continue this until the bleeding stops. This may take awhile. When bleeding stops, cover the site with a sterile bandage and keep your wrist still as much as possible.

## 2022-03-17 NOTE — Clinical Note
Left message for patient's to give us a call to discuss the lab result of Doretha   The catheter was inserted into the aorta. Hemodynamics were performed.

## 2022-03-17 NOTE — Clinical Note
The PA catheter was repositioned to the right pulmonary artery. Hemodynamics were performed. O2 saturation was measured at 77%.

## 2022-03-17 NOTE — H&P
[facility] - Short Stay Cardiac Unit  Pediatric Cardiology  History and Physical     Patient Name: Carlie Marcelino  MRN: [id number]  Admission Date: 3/17/2022  Code Status: No Order   Attending Provider: Hira Funes MD  Primary Cardiologist: Sd Olguin MD  Primary Care Physician: Lindsay Regalado NP  Principal Problem: MVR, coarctation repair    Subjective:     Chief Complaint: MVR, coarctation repair, exercise intolerance    HPI:  Carlie Marcelino is a 26 y.o. female here today for diagnostic catheterization for complex congenital heart disease.  She has exercise intolerance.    To summarize her diagnoses are as follow:  1. Coarctation of the aorta status post repair November 2000  - most recent catheterization August 2014  - no significant obstruction noted across coarctation repair  2. Status post resection of a supravalvar mitral ring and Primum atrial septal defect closed January 2001  - no residual obstruction within the left atrium, no evidence of residual intracardiac shunting  3. Severe subaortic stenosis treated with a modified Konno procedure May 2004 followed by VSD closure September 2004  - no left ventricular outflow obstruction and no significant aortic valve insufficiency  4. Severe mitral valve regurgitation necessitating mitral valve replacement with a 23 mm Saint Keith mechanical valve June 2010  - possible mild to moderate stenosis of the mechanical valve with peak and mean gradient across the valve as high as 21 and 12 mmHg, respectively.  - mild to moderate tricuspid insufficiency estimating right ventricular and pulmonary arterial systolic pressure around 45-50 mmHg.  - currently enrolled in Coumadin Clinic at [facility] in [place]  5. Reduced left ventricular systolic function primarily due to VSD patch  - left ventricular ejection fraction around 42% with motion of the myocardium   5. History of wide complex tachycardia/SVT 2020  6. Distant history of seizures, last seen by  Neurology at 10 years of age.    7. Pectus carinatum.  All surgeries performed at [facility].             Past Medical History:   Diagnosis Date    Aortic stenosis     Coarctation of aorta     Mitral valve stenosis        Past Surgical History:   Procedure Laterality Date    CARDIAC SURGERY      MITRAL VALVE REPLACEMENT      REPAIR OF COARCTATION OF AORTA         Review of patient's allergies indicates:  No Known Allergies    No current facility-administered medications on file prior to encounter.     Current Outpatient Medications on File Prior to Encounter   Medication Sig    JANTOVEN 6 mg tablet Take by mouth.    metoprolol tartrate (LOPRESSOR) 25 MG tablet Take 25 mg by mouth.     Family History    None       Social History     Social History Narrative    Not on file     Review of Systems   Constitutional:         Exercise intolerance   HENT: Negative.     Eyes: Negative.    Respiratory: Negative.     Cardiovascular: Negative.    Gastrointestinal: Negative.    Endocrine: Negative.    Genitourinary: Negative.    Musculoskeletal: Negative.    Allergic/Immunologic: Negative.    Neurological: Negative.    Hematological: Negative.    Psychiatric/Behavioral: Negative.     Objective:     Vital Signs (Most Recent):  Temp: 97.8 °F (36.6 °C) (03/17/22 0900)  Pulse: 77 (03/17/22 0900)  Resp: 16 (03/17/22 0900)  BP: 117/66 (03/17/22 0900)  SpO2: 98 % (03/17/22 0900) Vital Signs (24h Range):  Temp:  [97.8 °F (36.6 °C)] 97.8 °F (36.6 °C)  Pulse:  [77] 77  Resp:  [16] 16  SpO2:  [98 %] 98 %  BP: (117)/(66) 117/66     Weight: 47.6 kg (105 lb)  Body mass index is 21.95 kg/m².    SpO2: 98 %       No intake or output data in the 24 hours ending 03/17/22 1402    Lines/Drains/Airways       Peripheral Intravenous Line  Duration                  Peripheral IV - Single Lumen 03/17/22 0952 20 G Left Forearm <1 day         Peripheral IV - Single Lumen 03/17/22 1008 20 G Right Antecubital <1 day                     Physical Exam  Vitals and nursing note reviewed. Exam conducted with a chaperone present.   Constitutional:       General: She is not in acute distress.     Appearance: She is well-developed. She is not diaphoretic.   HENT:      Head: Normocephalic and atraumatic.      Right Ear: External ear normal.      Left Ear: External ear normal.      Nose: Nose normal.   Eyes:      General: No scleral icterus.        Right eye: No discharge.         Left eye: No discharge.      Conjunctiva/sclera: Conjunctivae normal.      Pupils: Pupils are equal, round, and reactive to light.   Neck:      Thyroid: No thyromegaly.   Cardiovascular:      Rate and Rhythm: Normal rate and regular rhythm.      Heart sounds: Murmur heard.     No friction rub. No gallop.   Pulmonary:      Effort: Pulmonary effort is normal. No respiratory distress.      Breath sounds: Normal breath sounds. No wheezing or rales.   Chest:      Chest wall: No tenderness.   Abdominal:      General: Bowel sounds are normal. There is no distension.      Palpations: Abdomen is soft. There is no mass.      Tenderness: There is no abdominal tenderness. There is no guarding or rebound.      Hernia: No hernia is present.   Musculoskeletal:         General: No tenderness or deformity. Normal range of motion.      Cervical back: Normal range of motion and neck supple.   Skin:     General: Skin is warm.   Neurological:      Mental Status: She is alert and oriented to person, place, and time.      Cranial Nerves: No cranial nerve deficit.      Motor: No abnormal muscle tone.      Coordination: Coordination normal.   Psychiatric:         Behavior: Behavior normal.         Thought Content: Thought content normal.         Judgment: Judgment normal.       Significant Labs: None    Significant Imaging:  Studies reviewed    Assessment and Plan:     Cardiac/Vascular  Adult congenital heart disease  Diagnostic catheterization.  Specific question to answer is the right ventricular  and pulmonary arterial pressure and left ventricular end-diastolic pressure.  We can assess for mitral stenosis.  Plan right radial and brachial access (INR 3.3)  Consent reviewed in detail, SOC            Hira Funes MD  Pediatric Cardiology   [facility] - Short Stay Cardiac Unit

## 2022-03-17 NOTE — NURSING
vasc band removed as per protocol.  Gauze/.tegaderm applied to right radial vasc band puncture site.  No bleeding or hematoma noted.  VSS.  Pt and pt mom verbalized an understanding of d/c instructions.  Tolerating fluids and crackers.  Denies pain or nausea or SOB.

## 2022-03-17 NOTE — PLAN OF CARE
Pt ambulated on unit this morning with mother at pt side.  Verbalized an understanding of procedure.  Oriented to unit and call bell provided.  Denies pain or SOB.  Will continue to monitor.

## 2022-03-17 NOTE — Clinical Note
10 ml of contrast were injected throughout the case. 90 mL of contrast was the total wasted during the case. 100 mL was the total amount used during the case.

## 2022-03-17 NOTE — HPI
Carlie Marcelino is a 26 y.o. female here today for diagnostic catheterization for complex congenital heart disease.  She has exercise intolerance.    To summarize her diagnoses are as follow:  1. Coarctation of the aorta status post repair November 2000  - most recent catheterization August 2014  - no significant obstruction noted across coarctation repair  2. Status post resection of a supravalvar mitral ring and Primum atrial septal defect closed January 2001  - no residual obstruction within the left atrium, no evidence of residual intracardiac shunting  3. Severe subaortic stenosis treated with a modified Konno procedure May 2004 followed by VSD closure September 2004  - no left ventricular outflow obstruction and no significant aortic valve insufficiency  4. Severe mitral valve regurgitation necessitating mitral valve replacement with a 23 mm Saint Keith mechanical valve June 2010  - possible mild to moderate stenosis of the mechanical valve with peak and mean gradient across the valve as high as 21 and 12 mmHg, respectively.  - mild to moderate tricuspid insufficiency estimating right ventricular and pulmonary arterial systolic pressure around 45-50 mmHg.  - currently enrolled in Coumadin Clinic at [facility] in [place]  5. Reduced left ventricular systolic function primarily due to VSD patch  - left ventricular ejection fraction around 42% with motion of the myocardium   5. History of wide complex tachycardia/SVT 2020  6. Distant history of seizures, last seen by Neurology at 10 years of age.    7. Pectus carinatum.  All surgeries performed at [facility].

## 2022-03-18 NOTE — ANESTHESIA POSTPROCEDURE EVALUATION
Anesthesia Post Evaluation    Patient: Carlie Pharris    Procedure(s) Performed: Procedure(s) (LRB):  CATHETERIZATION, HEART, COMBINED RIGHT AND RETROGRADE LEFT, FOR CONGENITAL HEART DEFECT (N/A)  ECHOCARDIOGRAM, TRANSTHORACIC    Final Anesthesia Type: general      Patient location during evaluation: PACU  Patient participation: Yes- Able to Participate  Level of consciousness: awake and alert, awake and oriented  Post-procedure vital signs: reviewed and stable  Pain management: adequate  Airway patency: patent    PONV status at discharge: No PONV  Anesthetic complications: no      Cardiovascular status: blood pressure returned to baseline, stable and hemodynamically stable  Respiratory status: unassisted, spontaneous ventilation and room air  Hydration status: euvolemic  Follow-up not needed.          Vitals Value Taken Time   /62 03/17/22 1730   Temp 36.6 03/18/22 0725   Pulse 70 03/17/22 1700   Resp 16 03/17/22 1730   SpO2 99 % 03/17/22 1730         No case tracking events are documented in the log.      Pain/Dakota Score: No data recorded

## 2022-04-09 ENCOUNTER — HISTORICAL (OUTPATIENT)
Dept: ADMINISTRATIVE | Facility: HOSPITAL | Age: 27
End: 2022-04-09

## 2022-04-26 VITALS
BODY MASS INDEX: 20.36 KG/M2 | OXYGEN SATURATION: 98 % | WEIGHT: 101 LBS | HEIGHT: 59 IN | DIASTOLIC BLOOD PRESSURE: 76 MMHG | SYSTOLIC BLOOD PRESSURE: 120 MMHG

## 2022-08-08 DIAGNOSIS — Z95.2 H/O MITRAL VALVE REPLACEMENT WITH MECHANICAL VALVE: Primary | ICD-10-CM

## 2022-08-08 DIAGNOSIS — Q24.9 ADULT CONGENITAL HEART DISEASE: ICD-10-CM

## 2022-08-08 DIAGNOSIS — Z87.74 H/O AORTIC COARCTATION REPAIR: ICD-10-CM

## 2022-10-06 ENCOUNTER — HOSPITAL ENCOUNTER (OUTPATIENT)
Dept: CARDIOLOGY | Facility: HOSPITAL | Age: 27
Discharge: HOME | End: 2022-10-06
Attending: PEDIATRICS
Payer: MEDICAID

## 2022-10-06 ENCOUNTER — OFFICE VISIT (OUTPATIENT)
Dept: CARDIOLOGY | Facility: CLINIC | Age: 27
End: 2022-10-06
Payer: MEDICAID

## 2022-10-06 ENCOUNTER — HOSPITAL ENCOUNTER (OUTPATIENT)
Dept: CARDIOLOGY | Facility: CLINIC | Age: 27
Discharge: HOME | End: 2022-10-06
Payer: MEDICAID

## 2022-10-06 VITALS
WEIGHT: 102.06 LBS | HEIGHT: 58 IN | DIASTOLIC BLOOD PRESSURE: 60 MMHG | SYSTOLIC BLOOD PRESSURE: 133 MMHG | BODY MASS INDEX: 21.42 KG/M2 | HEIGHT: 58 IN | HEART RATE: 73 BPM | WEIGHT: 105 LBS | BODY MASS INDEX: 22.04 KG/M2 | OXYGEN SATURATION: 98 %

## 2022-10-06 DIAGNOSIS — Z95.2 H/O MITRAL VALVE REPLACEMENT WITH MECHANICAL VALVE: ICD-10-CM

## 2022-10-06 DIAGNOSIS — Q24.9 ADULT CONGENITAL HEART DISEASE: ICD-10-CM

## 2022-10-06 DIAGNOSIS — Z87.74 H/O AORTIC COARCTATION REPAIR: ICD-10-CM

## 2022-10-06 DIAGNOSIS — Q24.9 ADULT CONGENITAL HEART DISEASE: Primary | ICD-10-CM

## 2022-10-06 DIAGNOSIS — I50.22 CHRONIC SYSTOLIC HEART FAILURE: ICD-10-CM

## 2022-10-06 DIAGNOSIS — Q24.4 SUBAORTIC STENOSIS: ICD-10-CM

## 2022-10-06 LAB
ASCENDING AORTA: 2.87 CM
AV INDEX (PROSTH): 0.49
AV MEAN GRADIENT: 12 MMHG
AV PEAK GRADIENT: 19 MMHG
AV VALVE AREA: 1.75 CM2
AV VELOCITY RATIO: 0.52
BSA FOR ECHO PROCEDURE: 1.4 M2
CV ECHO LV RWT: 0.39 CM
DOP CALC AO PEAK VEL: 2.17 M/S
DOP CALC AO VTI: 46.68 CM
DOP CALC LVOT AREA: 3.6 CM2
DOP CALC LVOT DIAMETER: 2.14 CM
DOP CALC LVOT PEAK VEL: 1.12 M/S
DOP CALC LVOT STROKE VOLUME: 81.5 CM3
DOP CALC MV VTI: 65.7 CM
DOP CALC RVOT PEAK VEL: 0.2 M/S
DOP CALC RVOT VTI: 11.13 CM
DOP CALCLVOT PEAK VEL VTI: 22.67 CM
ECHO LV POSTERIOR WALL: 0.85 CM (ref 0.6–1.1)
EJECTION FRACTION: 40 %
FRACTIONAL SHORTENING: 30 % (ref 28–44)
INTERVENTRICULAR SEPTUM: 0.8 CM (ref 0.6–1.1)
LA MAJOR: 4.91 CM
LA MINOR: 4.7 CM
LA WIDTH: 3.6 CM
LEFT ATRIUM SIZE: 4.63 CM
LEFT ATRIUM VOLUME INDEX MOD: 29.9 ML/M2
LEFT ATRIUM VOLUME INDEX: 49.3 ML/M2
LEFT ATRIUM VOLUME MOD: 41.22 CM3
LEFT ATRIUM VOLUME: 68.04 CM3
LEFT INTERNAL DIMENSION IN SYSTOLE: 3.1 CM (ref 2.1–4)
LEFT VENTRICLE DIASTOLIC VOLUME INDEX: 63.93 ML/M2
LEFT VENTRICLE DIASTOLIC VOLUME: 88.22 ML
LEFT VENTRICLE MASS INDEX: 83 G/M2
LEFT VENTRICLE SYSTOLIC VOLUME INDEX: 27.4 ML/M2
LEFT VENTRICLE SYSTOLIC VOLUME: 37.84 ML
LEFT VENTRICULAR INTERNAL DIMENSION IN DIASTOLE: 4.41 CM (ref 3.5–6)
LEFT VENTRICULAR MASS: 114.4 G
MV MEAN GRADIENT: 2 MMHG
MV PEAK GRADIENT: 17 MMHG
MV VALVE AREA BY CONTINUITY EQUATION: 1.24 CM2
PISA TR MAX VEL: 2.61 M/S
PV MEAN GRADIENT: 0.87 MMHG
PV PEAK VELOCITY: 0.94 CM/S
RA MAJOR: 3.65 CM
RA WIDTH: 3.2 CM
RIGHT VENTRICULAR END-DIASTOLIC DIMENSION: 4.8 CM
RV TISSUE DOPPLER FREE WALL SYSTOLIC VELOCITY 1 (APICAL 4 CHAMBER VIEW): 6.9 CM/S
SINUS: 3.19 CM
STJ: 2.62 CM
TDI LATERAL: 0.04 M/S
TR MAX PG: 27 MMHG
TRICUSPID ANNULAR PLANE SYSTOLIC EXCURSION: 1.33 CM

## 2022-10-06 PROCEDURE — 99214 OFFICE O/P EST MOD 30 MIN: CPT | Mod: 25,S$PBB,, | Performed by: PEDIATRICS

## 2022-10-06 PROCEDURE — 1159F MED LIST DOCD IN RCRD: CPT | Mod: CPTII,,, | Performed by: PEDIATRICS

## 2022-10-06 PROCEDURE — 1159F PR MEDICATION LIST DOCUMENTED IN MEDICAL RECORD: ICD-10-PCS | Mod: CPTII,,, | Performed by: PEDIATRICS

## 2022-10-06 PROCEDURE — 1160F PR REVIEW ALL MEDS BY PRESCRIBER/CLIN PHARMACIST DOCUMENTED: ICD-10-PCS | Mod: CPTII,,, | Performed by: PEDIATRICS

## 2022-10-06 PROCEDURE — 3078F PR MOST RECENT DIASTOLIC BLOOD PRESSURE < 80 MM HG: ICD-10-PCS | Mod: CPTII,,, | Performed by: PEDIATRICS

## 2022-10-06 PROCEDURE — 3008F BODY MASS INDEX DOCD: CPT | Mod: CPTII,,, | Performed by: PEDIATRICS

## 2022-10-06 PROCEDURE — 3078F DIAST BP <80 MM HG: CPT | Mod: CPTII,,, | Performed by: PEDIATRICS

## 2022-10-06 PROCEDURE — 3075F PR MOST RECENT SYSTOLIC BLOOD PRESS GE 130-139MM HG: ICD-10-PCS | Mod: CPTII,,, | Performed by: PEDIATRICS

## 2022-10-06 PROCEDURE — 93010 EKG 12-LEAD: ICD-10-PCS | Mod: S$PBB,,, | Performed by: INTERNAL MEDICINE

## 2022-10-06 PROCEDURE — 93303 ECHO (CUPID ONLY): ICD-10-PCS | Mod: 26,,, | Performed by: PEDIATRICS

## 2022-10-06 PROCEDURE — 99213 OFFICE O/P EST LOW 20 MIN: CPT | Mod: PBBFAC,25 | Performed by: PEDIATRICS

## 2022-10-06 PROCEDURE — 93303 ECHO TRANSTHORACIC: CPT | Mod: 26,,, | Performed by: PEDIATRICS

## 2022-10-06 PROCEDURE — 93325 ECHO (CUPID ONLY): ICD-10-PCS | Mod: 26,,, | Performed by: PEDIATRICS

## 2022-10-06 PROCEDURE — 93325 DOPPLER ECHO COLOR FLOW MAPG: CPT

## 2022-10-06 PROCEDURE — 1160F RVW MEDS BY RX/DR IN RCRD: CPT | Mod: CPTII,,, | Performed by: PEDIATRICS

## 2022-10-06 PROCEDURE — 93320 ECHO (CUPID ONLY): ICD-10-PCS | Mod: 26,,, | Performed by: PEDIATRICS

## 2022-10-06 PROCEDURE — 93325 DOPPLER ECHO COLOR FLOW MAPG: CPT | Mod: 26,,, | Performed by: PEDIATRICS

## 2022-10-06 PROCEDURE — 99999 PR PBB SHADOW E&M-EST. PATIENT-LVL III: CPT | Mod: PBBFAC,,, | Performed by: PEDIATRICS

## 2022-10-06 PROCEDURE — 99999 PR PBB SHADOW E&M-EST. PATIENT-LVL III: ICD-10-PCS | Mod: PBBFAC,,, | Performed by: PEDIATRICS

## 2022-10-06 PROCEDURE — 93005 ELECTROCARDIOGRAM TRACING: CPT | Mod: PBBFAC | Performed by: INTERNAL MEDICINE

## 2022-10-06 PROCEDURE — 99214 PR OFFICE/OUTPT VISIT, EST, LEVL IV, 30-39 MIN: ICD-10-PCS | Mod: 25,S$PBB,, | Performed by: PEDIATRICS

## 2022-10-06 PROCEDURE — 93320 DOPPLER ECHO COMPLETE: CPT | Mod: 26,,, | Performed by: PEDIATRICS

## 2022-10-06 PROCEDURE — 3075F SYST BP GE 130 - 139MM HG: CPT | Mod: CPTII,,, | Performed by: PEDIATRICS

## 2022-10-06 PROCEDURE — 3008F PR BODY MASS INDEX (BMI) DOCUMENTED: ICD-10-PCS | Mod: CPTII,,, | Performed by: PEDIATRICS

## 2022-10-06 PROCEDURE — 93010 ELECTROCARDIOGRAM REPORT: CPT | Mod: S$PBB,,, | Performed by: INTERNAL MEDICINE

## 2022-10-06 NOTE — PROGRESS NOTES
10/06/2022     re:Carlie Marcelino  :1995    Lindsay Regalado, NP  [address] [facility] [address]    [facility]     Dear Dr. Goodson and Ms. Regalado:    Carlie Marcelino is a 27 y.o. female seen in my [facility] clinic today for evaluation of congenital heart disease.  To summarize her diagnoses are as follow:  1. Coarctation of the aorta status post repair 2000  - most recent catheterization   - no significant obstruction noted across coarctation repair  2. Status post resection of a supravalvar mitral ring and Primum atrial septal defect closed 2001  - no residual obstruction within the left atrium, no evidence of residual intracardiac shunting  3. Severe subaortic stenosis treated with a modified Konno procedure May 2004 followed by VSD closure 2004  - no left ventricular outflow obstruction and no significant aortic valve insufficiency  4. Severe mitral valve regurgitation necessitating mitral valve replacement with a 23 mm Saint Keith mechanical valve 2010  - possible mild stenosis of the mechanical valve with 4 mm of mercury gradient noted on  catheterization  - mild to moderate tricuspid insufficiency   - no pulmonary hypertension or pulmonary vascular resistance elevation on catheterization   - currently enrolled in Coumadin Clinic at [facility] in [place]  5. Reduced left ventricular systolic function primarily due to VSD patch  - left ventricular ejection fraction around 45 %    5. History of wide complex tachycardia/SVT   6. Distant history of seizures, last seen by Neurology at 10 years of age.    7. Pectus carinatum.  All surgeries performed at [facility].      To summarize, my recommendations are as follows:  1. Continue Coumadin with goal INR 2.5-3.5.  2. Continue metoprolol  3. SBE prophylaxis is definitely indicated.  4. I would like to see her again in   months in [place] with plans for an echo and Holter.  Continue regular follow-up with Dr. Hill.    5. Any heart failure symptoms would prompt afterload reduction, likely with Entresto.      Discussion:  1. Her left ventricular ejection fraction is certainly not normal, but this is primarily due to dyskinetic motion of the septum and her VSD patch.  Her posterior in free wall moves very well.  Clinically, she is doing very well.  She certainly does not need a heart transplant, especially given her very reassuring catheterization numbers.  Given the lack of symptoms, I am going to hold off on adding any additional afterload reduction although I would be quick to consider adding Entresto if there were any concerning symptoms.  2. Her echocardiogram significantly overestimates the gradient across her mitral valve and her pulmonary artery pressures.  The catheterization done earlier this year was very reassuring with a mild mitral valve gradient and no pulmonary hypertension.    3. Her left ventricular outflow tract, aortic valve, and coarctation looked great.  4. We discussed pregnancy in the past.  I agree that she is a high risk pregnancy given her mechanical valve and high-dose Coumadin.      History of present illness:   I last saw her at the time of her catheterization back in March.  Since that time, she has been completely asymptomatic from a cardiovascular standpoint.  She is a very talkative and engaging woman.  She had absolutely no complaints.  She denies chest pain, dyspnea on exertion, shortness of breath, palpitations, syncope, near syncope, cyanosis, and edema.      The review of systems is as noted above. It is otherwise negative for other symptoms related to the general, neurological, psychiatric, endocrine, gastrointestinal, genitourinary, respiratory, dermatologic, musculoskeletal, hematologic, and immunologic systems.    Past Medical History:   Diagnosis Date    Aortic stenosis     Coarctation  of aorta     Mitral valve stenosis      Past Surgical History:   Procedure Laterality Date    CARDIAC SURGERY      COMBINED RIGHT AND RETROGRADE LEFT HEART CATHETERIZATION FOR CONGENITAL HEART DEFECT N/A 3/17/2022    Procedure: CATHETERIZATION, HEART, COMBINED RIGHT AND RETROGRADE LEFT, FOR CONGENITAL HEART DEFECT;  Surgeon: Sindhu Bellamy MD;  Location: [facility] CATH LAB;  Service: Cardiology;  Laterality: N/A;  Pedi Heart    MITRAL VALVE REPLACEMENT      REPAIR OF COARCTATION OF AORTA      TRANSTHORACIC ECHOCARDIOGRAPHY (TTE)  3/17/2022    Procedure: ECHOCARDIOGRAM, TRANSTHORACIC;  Surgeon: Sindhu Bellamy MD;  Location: [facility] CATH LAB;  Service: Cardiology;;     Family History   Problem Relation Age of Onset    No Known Problems Mother     Heart disease Father     Hypertension Father     Diabetes Father     No Known Problems Sister     No Known Problems Brother     No Known Problems Maternal Aunt     No Known Problems Maternal Uncle     No Known Problems Paternal Aunt     No Known Problems Paternal Uncle     No Known Problems Maternal Grandmother     No Known Problems Maternal Grandfather     No Known Problems Paternal Grandmother     No Known Problems Paternal Grandfather     No Known Problems Other     Anemia Neg Hx     Arrhythmia Neg Hx     Asthma Neg Hx     Clotting disorder Neg Hx     Fainting Neg Hx     Heart attack Neg Hx     Heart failure Neg Hx     Hyperlipidemia Neg Hx     Stroke Neg Hx     Atrial Septal Defect Neg Hx      Social History     Socioeconomic History    Marital status: Single   Tobacco Use    Smoking status: Never    Smokeless tobacco: Never   Substance and Sexual Activity    Alcohol use: Never    Drug use: Never     Current Outpatient Medications on File Prior to Visit   Medication Sig Dispense Refill    JANTOVEN 6 mg tablet Take by mouth Daily.      metoprolol tartrate (LOPRESSOR) 25 MG tablet Take 25 mg by mouth.       No current facility-administered medications on file prior  "to visit.     Review of patient's allergies indicates:  No Known Allergies     Vitals:    10/06/22 1520 10/06/22 1522   BP: (!) 129/59 133/60   BP Location: Right arm Left arm   Patient Position: Sitting Sitting   BP Method: Large (Automatic) Large (Automatic)   Pulse: 73 73   SpO2: 98%    Weight: 46.3 kg (102 lb 1.2 oz)    Height: 4' 10" (1.473 m)      In general, she is a small, very pleasant, very healthy-appearing nondysmorphic female in no apparent distress.  The eyes, nares, and oropharynx are clear.  Eyelids and conjunctiva are normal without drainage or erythema.  Pupils equal and round bilaterally.  The head is normocephalic and atraumatic.  The neck is supple without jugular venous distention or thyroid enlargement.  The lungs are clear to auscultation bilaterally.  There is a well-healed sternotomy incision.  A mechanical 1st heart sound is normal.  The 2nd heart sound is split.  A grade 1/6 systolic ejection murmur is heard at the upper sternal border.  I do hear a grade 1-2/6 blowing diastolic murmur at the apex.  The abdominal exam is benign without hepatosplenomegaly, tenderness, or distention.  Pulses are normal in all 4 extremities with brisk capillary refill and no clubbing, cyanosis, or edema.  No rashes are noted.    I personally reviewed the following tests performed today and my interpretation follows:  An EKG performed in clinic today reveals a superior leftward axis.  Sinus rhythm noted.  Right bundle branch block present with QRS duration about 150 milliseconds.  Single premature ventricular contraction noted.    Results for orders placed during the hospital encounter of 10/06/22    Echo    Interpretation Summary  CONGENITAL CARDIAC HISTORY:  Shone's complex with-  Coarctation of the aorta.  VSD  Subaortic stenosis  Supravalvar mitral ring  Primum atrial septal defect  S/P Coarctation repair - [facility] November 2000  S/P Resection of a supravalvar mitral ring and Primum ASD repair - [facility] " January 2001  S/P Modified Konno for severe subaortic stenosis - [facility] May 2004  [facility]  [facility]  S/P Mitral valve replacement with a 23 mm St Keith mechanical valve - [facility] June 2010      SEGMENTAL CARDIAC CONNECTIONS:  Abdominal situs solitus.  Atrial situs solitus.  Atrioventricular alignment is concordant.  D-loop ventricles.  The tricuspid valve appears grossly normal.  Mechanical valve in mitral position..  The right ventricle appears qualitatively normal.  There is mild to moderate dilation of the left ventricle.  The ventriculoarterial alignment is concordant.  The pulmonary valve is structurally normal.  Normal trileaflet aortic valve.  Cardiac position is levocardia.      IMPRESSION:  Technically difficult acoustic windows with intermittently irregular atrial rhythm.  Qualitatively mild right atrial enlargement.  Color Doppler demonstrates moderate tricuspid insufficiency.  Qualitative impression of normal right ventricular size and structure with good systolic function.  Right ventricular pressure estimated >27 mmHg above right atrial pressure from reasonably well defined TR doppler profile.  Echodensity consistent with very large VSD patch moving paradoxically with no residual shunt detected.The left atrial volume index is severely enlarged measuring 49 ml/m2.  Mechanical valve in mitral position with turbulent inflow and mean gradient <11 mm Hg (unchanged from previous).  Normal washing jets are noted.  Prominent paradoxical septal motion with good movement of the LV free wall, SF = 23% and EF estimated 40 -45% from apical views.  There is good movement of the apical regions of the left ventricle with the decreased ejection fraction primarily reflecting the prominent paradoxical movement of the VSD patch.  Unable to evaluate LV diastolic function with mechanical valve in mitral position.  There is scooped out appearance of the subaortic area with a small residual subaortic  ridge correlating with color Doppler convergence.  Phasic Doppler demonstrates no significant acceleration across this area.  The aortic valve structure is not well demonstrated in this study.  Peak velocity across the left ventricular outflow tract and aortic valve < 2.2 m/sec with mean gradient <12 mm Hg.  There is a trivial jet of aortic insufficiency demonstrated.  Aortic dimensions:  Sinuses of Valsalva = 32 mm.  ST junction             = 28 mm.  Ascending aorta     = 28 mm.  The aortic arch is difficult to demonstrate with mild acceleration across the area of coarctation repair with peak velocity <2.2 m/sec and no diastolic runoff.  There is no pericardial effusion.    Cath March 17, 2022:  IMPRESSION:  1) Coarctation s/p repair.  Supravalvar mitral ring resection and primum ASD closure.  Konno for subaortic stenosis with VSD closure.  23mm St. Keith mechanical mitral valve replacement.  2) Normal PA pressure, cardiac output, and vascular resistance calculations.  3) Mean mitral valve gradient 4mmHg  4) Anomalous right subclavian artery.     Cardiac MRI 1/27/22:  Conclusion:    Normal RV volumes with RVEF of 48%  Severe left atrial enlargement.   Well-seated mechanical mitral valve.   The left ventricle is dilated with increased volumes. There is a large VSD patch and paradoxical septal wall motion. Normal LV free wall motion. The calculated LVEF is 43%.   Bicuspid aortic valve with small annulus size. Regurgitant fraction 2%, regurgitant volume 1 cc. Normal velocity of 1.6m/sec. In cine images, there is slight flow aliasing in the subaortic area.   Low normal/mildly hypoplastic aorta (root, ascending aorta, and arch) with no discrete stenosis (measurements as noted above).   Left aortic arch with unusual branching. 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       Thank you for referring this patient to  our clinic.  Please call with any questions.    Sincerely,        Aguilra Olguin MD  Pediatric Cardiology  Adult Congenital Heart Disease  Pediatric Heart Failure and Transplantation  [facility] [address] [place]  [address]  [phone]

## 2022-10-18 ENCOUNTER — PATIENT MESSAGE (OUTPATIENT)
Dept: CARDIOLOGY | Facility: CLINIC | Age: 27
End: 2022-10-18
Payer: MEDICAID

## 2022-11-11 ENCOUNTER — TELEPHONE (OUTPATIENT)
Dept: PEDIATRIC CARDIOLOGY | Facility: CLINIC | Age: 27
End: 2022-11-11
Payer: MEDICAID

## 2022-11-11 NOTE — TELEPHONE ENCOUNTER
Received call from Coumadin Clinic letting us know that patient's INR was 1.8 today. She will stay on current dose of Coumadin and follow up next Friday.

## 2022-12-29 RX ORDER — METOPROLOL TARTRATE 25 MG/1
25 TABLET, FILM COATED ORAL 2 TIMES DAILY
Qty: 60 TABLET | Refills: 11 | Status: SHIPPED | OUTPATIENT
Start: 2022-12-29 | End: 2024-01-12 | Stop reason: SDUPTHER

## 2023-01-10 ENCOUNTER — TELEPHONE (OUTPATIENT)
Dept: PEDIATRIC CARDIOLOGY | Facility: CLINIC | Age: 28
End: 2023-01-10
Payer: MEDICAID

## 2023-01-10 NOTE — TELEPHONE ENCOUNTER
April schedule has not been released at this time, patient will call back at the end of the month.   ----- Message from Niurka Garvey sent at 1/10/2023  4:01 PM CST -----  Contact: EMILY [phone]  PT calling to schedule 6 month f/u with the echo,EKG and Holter for the [facility]. Please call to advise.

## 2023-02-02 ENCOUNTER — TELEPHONE (OUTPATIENT)
Dept: PEDIATRIC CARDIOLOGY | Facility: CLINIC | Age: 28
End: 2023-02-02
Payer: MEDICAID

## 2023-02-02 NOTE — TELEPHONE ENCOUNTER
Attempted to return call. No answer, VM left.  Scheduled next available [place] appointment 5/15/23 with start time 9 AM.    ----- Message from Claire Trotter sent at 2/2/2023  8:53 AM CST -----  Contact: Carlie self [phone] or [phone]  [email or web address]     Patient is calling for Medical Advice regarding:    How long has patient had these symptoms:    Pharmacy name and phone#:    Would like response via Z[employer]ist:call back    Comments: Pt is requesting a call back from the nurse because she needs to make an appt, but would like to go to the [place] location

## 2023-03-13 DIAGNOSIS — Z87.74 H/O AORTIC COARCTATION REPAIR: ICD-10-CM

## 2023-03-13 DIAGNOSIS — I50.22 CHRONIC SYSTOLIC HEART FAILURE: ICD-10-CM

## 2023-03-13 DIAGNOSIS — Q24.4 SUBAORTIC STENOSIS: ICD-10-CM

## 2023-03-13 DIAGNOSIS — Q24.9 ADULT CONGENITAL HEART DISEASE: Primary | ICD-10-CM

## 2023-03-13 DIAGNOSIS — Z95.2 H/O MITRAL VALVE REPLACEMENT WITH MECHANICAL VALVE: ICD-10-CM

## 2023-05-14 NOTE — PROGRESS NOTES
05/15/2023     re:Carlie Marcelino  :1995    Lindsay Regalado, NP  [address] [facility] [address]    [facility]     Dear Dr. Goodson and Ms. Regalado:    Carlie Marcelino is a 27 y.o. female seen in my ACHD clinic today for evaluation of congenital heart disease.  To summarize her diagnoses are as follow:  1. Coarctation of the aorta status post repair 2000  - most recent catheterization   - no significant obstruction noted across coarctation repair  2. Status post resection of a supravalvar mitral ring and Primum atrial septal defect closed 2001  - no residual obstruction within the left atrium, no evidence of residual intracardiac shunting  3. Severe subaortic stenosis treated with a modified Konno procedure May 2004 followed by VSD closure 2004  - no left ventricular outflow obstruction and no significant aortic valve insufficiency  4. Severe mitral valve regurgitation necessitating mitral valve replacement with a 23 mm Saint Keith mechanical valve 2010  - trivial stenosis of the mechanical valve with 4 mm of mercury gradient noted on  catheterization  - moderate tricuspid insufficiency   - no pulmonary hypertension or pulmonary vascular resistance elevation on catheterization   - currently enrolled in [facility] in [place]  5. Reduced left ventricular systolic function primarily due to VSD patch  - left ventricular ejection fraction around 45%  (although dyskinetic basal septum from VSD patch plays a role - EF today was in the 45-55% range depending on view)  5. History of wide complex tachycardia/SVT   6. Distant history of seizures, last seen by Neurology at 10 years of age.    7. Pectus carinatum.  [facility].      To summarize, my recommendations are as follows:  1. Continue Coumadin with goal INR 2.5-3.5.  2.  Continue metoprolol  3. SBE prophylaxis is definitely indicated.  4. I would like to see her again in 6 months in [place] with plans for an echo and EKG.  Can likely space to yearly visits at that time.    5. Any heart failure symptoms would prompt afterload reduction, likely with Entresto.    6. Holter today.    Discussion:  1. Her left ventricular ejection fraction is not normal, but this is primarily due to dyskinetic motion of the septum and her VSD patch.  Her posterior and free walls move very well.  Clinically, she is doing very well.  She certainly does not need a heart transplant, especially given her very reassuring catheterization numbers.  Given the lack of symptoms, I am going to hold off on adding any additional afterload reduction although I would be quick to consider adding Entresto if there were any concerning symptoms.  2. Her echocardiogram significantly overestimates the gradient across her mitral valve and her pulmonary artery pressures.  The catheterization done in 2022 was very reassuring with a mild mitral valve gradient and no pulmonary hypertension.    3. Her left ventricular outflow tract, aortic valve, and coarctation looked great.  4. We discussed pregnancy in the past.  I agree that she is a high risk pregnancy given her mechanical valve and high-dose Coumadin along with her mildly reduced function.      History of present illness:   I last saw her 6 months ago.  Since that time, she has been completely asymptomatic from a cardiovascular standpoint.  She is a very talkative and engaging woman.  She had absolutely no complaints.  She denies chest pain, dyspnea on exertion, shortness of breath, palpitations, syncope, near syncope, cyanosis, and edema.      The review of systems is as noted above. It is otherwise negative for other symptoms related to the general, neurological, psychiatric, endocrine, gastrointestinal, genitourinary, respiratory, dermatologic, musculoskeletal,  hematologic, and immunologic systems.    Past Medical History:   Diagnosis Date    Aortic stenosis     Coarctation of aorta     Mitral valve stenosis      Past Surgical History:   Procedure Laterality Date    CARDIAC SURGERY      COMBINED RIGHT AND RETROGRADE LEFT HEART CATHETERIZATION FOR CONGENITAL HEART DEFECT N/A 3/17/2022    Procedure: CATHETERIZATION, HEART, COMBINED RIGHT AND RETROGRADE LEFT, FOR CONGENITAL HEART DEFECT;  Surgeon: Sindhu Bellamy MD;  Location: [facility] CATH LAB;  Service: Cardiology;  Laterality: N/A;  Pedi Heart    MITRAL VALVE REPLACEMENT      REPAIR OF COARCTATION OF AORTA      TRANSTHORACIC ECHOCARDIOGRAPHY (TTE)  3/17/2022    Procedure: ECHOCARDIOGRAM, TRANSTHORACIC;  Surgeon: Sindhu Bellamy MD;  Location: [facility] CATH LAB;  Service: Cardiology;;     Family History   Problem Relation Age of Onset    No Known Problems Mother     Heart disease Father     Hypertension Father     Diabetes Father     No Known Problems Sister     No Known Problems Brother     No Known Problems Maternal Aunt     No Known Problems Maternal Uncle     No Known Problems Paternal Aunt     No Known Problems Paternal Uncle     No Known Problems Maternal Grandmother     No Known Problems Maternal Grandfather     No Known Problems Paternal Grandmother     No Known Problems Paternal Grandfather     No Known Problems Other     Anemia Neg Hx     Arrhythmia Neg Hx     Asthma Neg Hx     Clotting disorder Neg Hx     Fainting Neg Hx     Heart attack Neg Hx     Heart failure Neg Hx     Hyperlipidemia Neg Hx     Stroke Neg Hx     Atrial Septal Defect Neg Hx      Social History     Socioeconomic History    Marital status: Single   Tobacco Use    Smoking status: Never    Smokeless tobacco: Never   Substance and Sexual Activity    Alcohol use: Never    Drug use: Never     Current Outpatient Medications on File Prior to Visit   Medication Sig Dispense Refill    JANTOVEN 6 mg tablet Take by mouth Daily.      metoprolol  "tartrate (LOPRESSOR) 25 MG tablet Take 1 tablet (25 mg total) by mouth 2 (two) times daily. 60 tablet 11     No current facility-administered medications on file prior to visit.     Review of patient's allergies indicates:  No Known Allergies     Vitals:    05/15/23 0900   BP: 120/68   BP Location: Right arm   Patient Position: Sitting   BP Method: Medium (Automatic)   Pulse: 87   Resp: 16   SpO2: 98%   Weight: 47 kg (103 lb 11.2 oz)   Height: 4' 10" (1.473 m)     In general, she is a small, very pleasant, very healthy-appearing nondysmorphic female in no apparent distress.  The eyes, nares, and oropharynx are clear.  Eyelids and conjunctiva are normal without drainage or erythema.  Pupils equal and round bilaterally.  The head is normocephalic and atraumatic.  The neck is supple without jugular venous distention or thyroid enlargement.  The lungs are clear to auscultation bilaterally.  There is a well-healed sternotomy incision.  A mechanical 1st heart sound is normal.  The 2nd heart sound is split.  A grade 1/6 systolic ejection murmur is heard at the upper sternal border.  I do hear a grade 1/6 blowing diastolic murmur at the apex.  The abdominal exam is benign without hepatosplenomegaly, tenderness, or distention.  Pulses are normal in all 4 extremities with brisk capillary refill and no clubbing, cyanosis, or edema.  No rashes are noted.    I personally reviewed the following tests performed today and my interpretation follows:  An EKG performed in clinic today reveals a superior leftward axis.  Sinus rhythm noted.  Right bundle branch block present with QRS duration about 150 milliseconds.  Single premature ventricular contraction noted.    Echo today:  1. Mild left atrial enlargement  2. Moderate tricuspid insufficiency  3. The aortic valve morphology was not visualized. There is flow acceleration thet   starts proximal to the aortic valve. The LVOT/aortic valve velocity is at the   upper limits of normal " with a peak velocity of 2.2 m/sec, mean pressure gradient   of 10 mmHg. Trivial aortic valve insufficiency.  4. The mechanical mitral valve is well seated with a mean pressure gradietn of 10 mmHg and no significant regurgitation. Peak velocity around 2.16 m/s..<BR>4. The ventricular septum is echobright. Qualitatively the left ventricle is mildly dilated with basal septal   hypokinesis, good posterior wall motion and overall low normal systolic function   with EF around 55%. Qualitatively normal right ventricular size and systolic   function.  5. The tricuspid regurgitant jet peak velocity is 2.5 m/sec,   estimating a right ventricular pressure of 25 mmHg above the right atrial   pressure.  6. Occasional premature beats noted  7. There is flow acceleration thet starts proximal to the aortic valve. The LVOT/aortic valve velocity is at the upper limits of normal with a peak velocity of 1.8 m/sec,   mean pressure gradient of 2 mmHg.  8. Previously noted left aortic arch. Well repaired coarctation with very mild flow acceleration (2.1 m/s) across the repair.  No significant change from last echo.    Cath March 17, 2022:  IMPRESSION:  1) Coarctation s/p repair.  Supravalvar mitral ring resection and primum ASD closure.  Konno for subaortic stenosis with VSD closure.  23mm St. Keith mechanical mitral valve replacement.  2) Normal PA pressure, cardiac output, and vascular resistance calculations.  3) Mean mitral valve gradient 4mmHg  4) Anomalous right subclavian artery.     Cardiac MRI 1/27/22:  Conclusion:    Normal RV volumes with RVEF of 48%  Severe left atrial enlargement.   Well-seated mechanical mitral valve.   The left ventricle is dilated with increased volumes. There is a large VSD patch and paradoxical septal wall motion. Normal LV free wall motion. The calculated LVEF is 43%.   Bicuspid aortic valve with small annulus size. Regurgitant fraction 2%, regurgitant volume 1 cc. Normal velocity of 1.6m/sec. In cine  images, there is slight flow aliasing in the subaortic area.   Low normal/mildly hypoplastic aorta (root, ascending aorta, and arch) with no discrete stenosis (measurements as noted above).   Left aortic arch with unusual branching. 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       Thank you for referring this patient to our clinic.  Please call with any questions.    Sincerely,        Aguilar Olguin MD  Pediatric Cardiology  Adult Congenital Heart Disease  Pediatric Heart Failure and Transplantation  [facility] [address] [place]  [address]  [phone]

## 2023-05-15 ENCOUNTER — CLINICAL SUPPORT (OUTPATIENT)
Dept: PEDIATRIC CARDIOLOGY | Facility: CLINIC | Age: 28
End: 2023-05-15
Attending: PEDIATRICS
Payer: MEDICAID

## 2023-05-15 ENCOUNTER — CLINICAL SUPPORT (OUTPATIENT)
Dept: PEDIATRIC CARDIOLOGY | Facility: CLINIC | Age: 28
End: 2023-05-15
Payer: MEDICAID

## 2023-05-15 ENCOUNTER — OFFICE VISIT (OUTPATIENT)
Dept: PEDIATRIC CARDIOLOGY | Facility: CLINIC | Age: 28
End: 2023-05-15
Payer: MEDICAID

## 2023-05-15 VITALS
HEART RATE: 87 BPM | OXYGEN SATURATION: 98 % | RESPIRATION RATE: 16 BRPM | SYSTOLIC BLOOD PRESSURE: 120 MMHG | BODY MASS INDEX: 21.76 KG/M2 | WEIGHT: 103.69 LBS | DIASTOLIC BLOOD PRESSURE: 68 MMHG | HEIGHT: 58 IN

## 2023-05-15 DIAGNOSIS — Q24.4 SUBAORTIC STENOSIS: ICD-10-CM

## 2023-05-15 DIAGNOSIS — Z95.2 H/O MITRAL VALVE REPLACEMENT WITH MECHANICAL VALVE: ICD-10-CM

## 2023-05-15 DIAGNOSIS — Z87.74 H/O AORTIC COARCTATION REPAIR: ICD-10-CM

## 2023-05-15 DIAGNOSIS — Q24.9 ADULT CONGENITAL HEART DISEASE: ICD-10-CM

## 2023-05-15 DIAGNOSIS — I50.22 CHRONIC SYSTOLIC HEART FAILURE: ICD-10-CM

## 2023-05-15 DIAGNOSIS — Q24.9 ADULT CONGENITAL HEART DISEASE: Primary | ICD-10-CM

## 2023-05-15 PROCEDURE — 93000 EKG 12-LEAD PEDIATRIC: ICD-10-PCS | Mod: S$GLB,,, | Performed by: PEDIATRICS

## 2023-05-15 PROCEDURE — 3008F PR BODY MASS INDEX (BMI) DOCUMENTED: ICD-10-PCS | Mod: CPTII,S$GLB,, | Performed by: PEDIATRICS

## 2023-05-15 PROCEDURE — 93244 EXT ECG>48HR<7D REV&INTERPJ: CPT | Mod: ,,, | Performed by: PEDIATRICS

## 2023-05-15 PROCEDURE — 3074F SYST BP LT 130 MM HG: CPT | Mod: CPTII,S$GLB,, | Performed by: PEDIATRICS

## 2023-05-15 PROCEDURE — 93242 CV 3-14 DAY PEDIATRIC HOLTER MONITOR (CUPID ONLY): ICD-10-PCS | Mod: ,,, | Performed by: PEDIATRICS

## 2023-05-15 PROCEDURE — 1159F PR MEDICATION LIST DOCUMENTED IN MEDICAL RECORD: ICD-10-PCS | Mod: CPTII,S$GLB,, | Performed by: PEDIATRICS

## 2023-05-15 PROCEDURE — 99214 PR OFFICE/OUTPT VISIT, EST, LEVL IV, 30-39 MIN: ICD-10-PCS | Mod: 25,S$GLB,, | Performed by: PEDIATRICS

## 2023-05-15 PROCEDURE — 3078F DIAST BP <80 MM HG: CPT | Mod: CPTII,S$GLB,, | Performed by: PEDIATRICS

## 2023-05-15 PROCEDURE — 3074F PR MOST RECENT SYSTOLIC BLOOD PRESSURE < 130 MM HG: ICD-10-PCS | Mod: CPTII,S$GLB,, | Performed by: PEDIATRICS

## 2023-05-15 PROCEDURE — 1159F MED LIST DOCD IN RCRD: CPT | Mod: CPTII,S$GLB,, | Performed by: PEDIATRICS

## 2023-05-15 PROCEDURE — 99214 OFFICE O/P EST MOD 30 MIN: CPT | Mod: 25,S$GLB,, | Performed by: PEDIATRICS

## 2023-05-15 PROCEDURE — 3008F BODY MASS INDEX DOCD: CPT | Mod: CPTII,S$GLB,, | Performed by: PEDIATRICS

## 2023-05-15 PROCEDURE — 3078F PR MOST RECENT DIASTOLIC BLOOD PRESSURE < 80 MM HG: ICD-10-PCS | Mod: CPTII,S$GLB,, | Performed by: PEDIATRICS

## 2023-05-15 PROCEDURE — 93244 CV 3-14 DAY PEDIATRIC HOLTER MONITOR (CUPID ONLY): ICD-10-PCS | Mod: ,,, | Performed by: PEDIATRICS

## 2023-05-15 PROCEDURE — 93242 EXT ECG>48HR<7D RECORDING: CPT | Mod: ,,, | Performed by: PEDIATRICS

## 2023-05-15 PROCEDURE — 93000 ELECTROCARDIOGRAM COMPLETE: CPT | Mod: S$GLB,,, | Performed by: PEDIATRICS

## 2023-05-15 NOTE — Clinical Note
6 months echo, ekg in [place]
Airway patent, TM normal bilaterally, normal appearing mouth, nose, throat, neck supple with full range of motion, no cervical adenopathy.

## 2023-05-26 ENCOUNTER — TELEPHONE (OUTPATIENT)
Dept: PEDIATRIC CARDIOLOGY | Facility: CLINIC | Age: 28
End: 2023-05-26
Payer: MEDICAID

## 2023-05-26 NOTE — TELEPHONE ENCOUNTER
Call received from Veronica with the Coumadin clinic at the [facility] regarding PT/INR for this patient. Per Veronica, PT is 21.1 and INR 1.9. Patient missed warfarin dose today. She will give bolus dose of warfarin 9mg today, then increase dose to 3mg warfarin on Tuesday, then 6mg warfarin for the following 6 days. Will recheck PT/INR again in 1 week.

## 2023-05-31 ENCOUNTER — PATIENT MESSAGE (OUTPATIENT)
Dept: PEDIATRIC CARDIOLOGY | Facility: CLINIC | Age: 28
End: 2023-05-31
Payer: MEDICAID

## 2023-05-31 LAB
OHS CV EVENT MONITOR DAY: 1
OHS CV HOLTER HOOKUP DATE: NORMAL
OHS CV HOLTER HOOKUP TIME: NORMAL
OHS CV HOLTER LENGTH DECIMAL HOURS: 25
OHS CV HOLTER LENGTH HOURS: 1
OHS CV HOLTER LENGTH MINUTES: 0
OHS CV HOLTER SCAN DATE: NORMAL
OHS CV HOLTER SINUS AVERAGE HR: 71 BPM
OHS CV HOLTER SINUS MAX HR: 160 BPM
OHS CV HOLTER SINUS MIN HR: 53 BPM
OHS CV HOLTER STUDY END DATE: NORMAL
OHS CV HOLTER STUDY END TIME: NORMAL

## 2023-10-31 ENCOUNTER — PATIENT MESSAGE (OUTPATIENT)
Dept: CARDIOLOGY | Facility: CLINIC | Age: 28
End: 2023-10-31
Payer: MEDICAID

## 2023-10-31 DIAGNOSIS — Q24.4 SUBAORTIC STENOSIS: ICD-10-CM

## 2023-10-31 DIAGNOSIS — Z87.74 H/O AORTIC COARCTATION REPAIR: ICD-10-CM

## 2023-10-31 DIAGNOSIS — Z95.2 H/O MITRAL VALVE REPLACEMENT WITH MECHANICAL VALVE: ICD-10-CM

## 2023-10-31 DIAGNOSIS — Q24.9 ADULT CONGENITAL HEART DISEASE: Primary | ICD-10-CM

## 2023-11-13 ENCOUNTER — CLINICAL SUPPORT (OUTPATIENT)
Dept: PEDIATRIC CARDIOLOGY | Facility: CLINIC | Age: 28
End: 2023-11-13
Payer: MEDICAID

## 2023-11-13 VITALS
BODY MASS INDEX: 21.83 KG/M2 | HEIGHT: 58 IN | WEIGHT: 104 LBS | DIASTOLIC BLOOD PRESSURE: 64 MMHG | HEART RATE: 76 BPM | SYSTOLIC BLOOD PRESSURE: 116 MMHG | OXYGEN SATURATION: 98 % | RESPIRATION RATE: 18 BRPM

## 2023-11-13 DIAGNOSIS — Q24.4 SUBAORTIC STENOSIS: ICD-10-CM

## 2023-11-13 DIAGNOSIS — Q24.9 ADULT CONGENITAL HEART DISEASE: ICD-10-CM

## 2023-11-13 DIAGNOSIS — Z95.2 H/O MITRAL VALVE REPLACEMENT WITH MECHANICAL VALVE: Primary | ICD-10-CM

## 2023-11-13 DIAGNOSIS — Z87.74 H/O AORTIC COARCTATION REPAIR: ICD-10-CM

## 2023-11-13 DIAGNOSIS — Z95.2 H/O MITRAL VALVE REPLACEMENT WITH MECHANICAL VALVE: ICD-10-CM

## 2023-11-13 PROCEDURE — 93000 EKG 12-LEAD: ICD-10-PCS | Mod: S$GLB,,, | Performed by: PEDIATRICS

## 2023-11-13 PROCEDURE — 93000 ELECTROCARDIOGRAM COMPLETE: CPT | Mod: S$GLB,,, | Performed by: PEDIATRICS

## 2023-11-26 NOTE — PROGRESS NOTES
2023     re:Carlie Marcelino  :1995    Lindsay Regalado, NP  [address] [facility] [address]    Dr. Barry Goodson    [facility]     The patient location is: Home  The chief complaint leading to consultation is: congenital heart disease    Visit type: audiovisual    Face to Face time with patient: 10  30 minutes of total time spent on the encounter, which includes face to face time and non-face to face time preparing to see the patient (eg, review of tests), Obtaining and/or reviewing separately obtained history, Documenting clinical information in the electronic or other health record, Independently interpreting results (not separately reported) and communicating results to the patient/family/caregiver, or Care coordination (not separately reported).         Each patient to whom he or she provides medical services by telemedicine is:  (1) informed of the relationship between the physician and patient and the respective role of any other health care provider with respect to management of the patient; and (2) notified that he or she may decline to receive medical services by telemedicine and may withdraw from such care at any time.    Notes:     Dear Dr. Goodson and Ms. Regalado:    Carlie Marcelino is a 28 y.o. female seen in my ACHD clinic today for evaluation of congenital heart disease.  To summarize her diagnoses are as follow:  1. Coarctation of the aorta status post repair 2000  - most recent catheterization   - no significant obstruction noted across coarctation repair  2. Status post resection of a supravalvar mitral ring and Primum atrial septal defect closed 2001  - no residual obstruction within the left atrium, no evidence of residual intracardiac shunting  3. Severe subaortic stenosis treated with a modified Konno procedure May 2004 followed by VSD closure 2004  - no left ventricular outflow obstruction and no  significant aortic valve insufficiency  4. Severe mitral valve regurgitation necessitating mitral valve replacement with a 23 mm Saint Keith mechanical valve June 2010  - trivial stenosis of the mechanical valve with 4 mm of mercury gradient noted on 2022 catheterization  - moderate tricuspid insufficiency   - no pulmonary hypertension or pulmonary vascular resistance elevation on catheterization 2022  - currently enrolled in Coumadin Clinic at [facility] in [place]  5. Reduced left ventricular systolic function primarily due to VSD patch  - left ventricular ejection fraction around 45-55%  (although dyskinetic basal septum from VSD patch plays a role - EF this month was in the 50-55% range depending on view)  5. History of wide complex tachycardia/SVT 2020  6. Distant history of seizures, last seen by Neurology at 10 years of age.    7. Pectus carinatum.  All surgeries performed at [facility].      To summarize, my recommendations are as follows:  1. Continue Coumadin with goal INR 2.5-3.5.   - add baby aspirin  2. Continue metoprolol  3. SBE prophylaxis is definitely indicated.  4. I would like to see her again in 6 months in [place] with plans for an echo and EKG.  Can likely space to yearly visits at that time.    5. Any heart failure symptoms would prompt afterload reduction, likely with Entresto.    6. Check baseline labs with next INR check ([facility]).    Discussion:  1. Her left ventricular ejection fraction is low normal, but this is primarily due to dyskinetic motion of the septum and her VSD patch.  Her posterior and free walls move very well.  Clinically, she is doing very well.  She certainly does not need a heart transplant, especially given her very reassuring catheterization numbers.  Given the lack of symptoms, I am going to hold off on adding any additional afterload reduction although I would be quick to consider adding Entresto if there were any  concerning symptoms.  2. Her echocardiogram significantly overestimates the gradient across her mitral valve and her pulmonary artery pressures.  The catheterization done in 2022 was very reassuring with a mild mitral valve gradient and no pulmonary hypertension.    3. Her left ventricular outflow tract, aortic valve, and coarctation looked great.  4. We discussed pregnancy in the past.  I agree that she is a high risk pregnancy given her mechanical valve and high-dose Coumadin along with her mildly reduced function.  5. Adding a baby aspirin to the coumadin is indicated.      History of present illness:   I last saw her 6 months ago.  Her INR has averaged about 3.0 since that time.  She has been completely asymptomatic from a cardiovascular standpoint.  She is a very talkative and engaging woman.  She had absolutely no complaints.  She denies chest pain, dyspnea on exertion, shortness of breath, palpitations, syncope, near syncope, cyanosis, and edema.      The review of systems is as noted above. It is otherwise negative for other symptoms related to the general, neurological, psychiatric, endocrine, gastrointestinal, genitourinary, respiratory, dermatologic, musculoskeletal, hematologic, and immunologic systems.    Past Medical History:   Diagnosis Date    Aortic stenosis     Coarctation of aorta     Mitral valve stenosis      Past Surgical History:   Procedure Laterality Date    CARDIAC SURGERY      COMBINED RIGHT AND RETROGRADE LEFT HEART CATHETERIZATION FOR CONGENITAL HEART DEFECT N/A 3/17/2022    Procedure: CATHETERIZATION, HEART, COMBINED RIGHT AND RETROGRADE LEFT, FOR CONGENITAL HEART DEFECT;  Surgeon: Sindhu Bellamy MD;  Location: [facility] CATH LAB;  Service: Cardiology;  Laterality: N/A;  Pedi Heart    MITRAL VALVE REPLACEMENT      REPAIR OF COARCTATION OF AORTA      TRANSTHORACIC ECHOCARDIOGRAPHY (TTE)  3/17/2022    Procedure: ECHOCARDIOGRAM, TRANSTHORACIC;  Surgeon: Sindhu Bellamy MD;   "Location: [facility] CATH LAB;  Service: Cardiology;;     Family History   Problem Relation Age of Onset    No Known Problems Mother     Heart disease Father     Hypertension Father     Diabetes Father     No Known Problems Sister     No Known Problems Brother     No Known Problems Maternal Aunt     No Known Problems Maternal Uncle     No Known Problems Paternal Aunt     No Known Problems Paternal Uncle     No Known Problems Maternal Grandmother     No Known Problems Maternal Grandfather     No Known Problems Paternal Grandmother     No Known Problems Paternal Grandfather     No Known Problems Other     Anemia Neg Hx     Arrhythmia Neg Hx     Asthma Neg Hx     Clotting disorder Neg Hx     Fainting Neg Hx     Heart attack Neg Hx     Heart failure Neg Hx     Hyperlipidemia Neg Hx     Stroke Neg Hx     Atrial Septal Defect Neg Hx      Social History     Socioeconomic History    Marital status: Single   Tobacco Use    Smoking status: Never    Smokeless tobacco: Never   Substance and Sexual Activity    Alcohol use: Never    Drug use: Never     Current Outpatient Medications on File Prior to Visit   Medication Sig Dispense Refill    JANTOVEN 6 mg tablet Take by mouth Daily.      metoprolol tartrate (LOPRESSOR) 25 MG tablet Take 1 tablet (25 mg total) by mouth 2 (two) times daily. 60 tablet 11     No current facility-administered medications on file prior to visit.     Review of patient's allergies indicates:  No Known Allergies      11/13/2023   Vitals - 1 value per visit    SYSTOLIC 116    DIASTOLIC 64    Pulse 76    Temp    Resp 18    SPO2 98 %    Weight (lb) 104    Weight (kg) 47.174    Height 4' 10" (1.473 m)    BMI (Calculated) 21.7        In general, she is a small, very pleasant, very healthy-appearing nondysmorphic female in no apparent distress.      EKG 11/15/23:  Normal sinus rhythm with sinus arrhythmia   Right bundle branch block   Left anterior fascicular block   LVH with repolarization abnormality   Since " previous tracing - no significant change     Echo 11/13/23:  Shone's complex. Coarctation s/p repair, supravalvar mitral ring resection and primum atrial septal defect closure. Yonis for subaortic stenosis with ventricular septal defect closure. S/p 23mm St. Ekith mechanical mitral valve replacement. The study was technically limited with all images being suboptimal in quality.   1. Mild left atrial enlargement   2. Moderate tricuspid insufficiency   3. The aortic valve morphology was not visualized. There is flow acceleration thet starts proximal to the aortic valve. The LVOT/aortic valve velocity is at the upper limits of normal with a peak velocity of 2.2 m/sec and no obvious aortic insufficiency   4. The mechanical mitral valve is well seated with a mean pressure gradient ranging from 9-19 mmHg and no significant regurgitation. Peak velocity around 2.4 m/s.  5. The ventricular septum is echobright. Qualitatively the left ventricle is mildly dilated with basal septal hypokinesis, good posterior wall motion and overall low normal systolic function with EF around 55%. Qualitatively normal right ventricular size and systolic function.   5. The tricuspid regurgitant jet peak velocity is 2.5 m/sec, estimating a right ventricular pressure of 25 mmHg above the right atrial pressure.   6. PVCs and possible ventricular triplet noted   7. Previously noted left aortic arch. Well repaired coarctation with very mild flow acceleration (2.3 m/s) across the repair    Holter 5/15/23:  Sinus rhythm predominates.  6 episodes of non-sustained SVT  Longest 11-beats duration  Fastest avg 151bpm  Not associated with a patient-triggered event.  Normal HR range.  No patient-triggered events.  1 atrial triplet, otherwise no significant ectopy burden.  Frequent isolated PVCs (11.0%) with one ventriclar triplet.    Cath March 17, 2022:  IMPRESSION:  1) Coarctation s/p repair.  Supravalvar mitral ring resection and primum ASD closure.  Yonis  for subaortic stenosis with VSD closure.  23mm St. Keith mechanical mitral valve replacement.  2) Normal PA pressure, cardiac output, and vascular resistance calculations.  3) Mean mitral valve gradient 4mmHg  4) Anomalous right subclavian artery.     Cardiac MRI 1/27/22:  Conclusion:    Normal RV volumes with RVEF of 48%  Severe left atrial enlargement.   Well-seated mechanical mitral valve.   The left ventricle is dilated with increased volumes. There is a large VSD patch and paradoxical septal wall motion. Normal LV free wall motion. The calculated LVEF is 43%.   Bicuspid aortic valve with small annulus size. Regurgitant fraction 2%, regurgitant volume 1 cc. Normal velocity of 1.6m/sec. In cine images, there is slight flow aliasing in the subaortic area.   Low normal/mildly hypoplastic aorta (root, ascending aorta, and arch) with no discrete stenosis (measurements as noted above).   Left aortic arch with unusual branching. 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       Thank you for referring this patient to our clinic.  Please call with any questions.    Sincerely,        Aguilar Olguin MD  Pediatric Cardiology  Adult Congenital Heart Disease  Pediatric Heart Failure and Transplantation  [facility]  [address] [place]  [address]  [phone]

## 2023-11-27 ENCOUNTER — OFFICE VISIT (OUTPATIENT)
Dept: PEDIATRIC CARDIOLOGY | Facility: CLINIC | Age: 28
End: 2023-11-27
Payer: MEDICAID

## 2023-11-27 DIAGNOSIS — Q24.9 ADULT CONGENITAL HEART DISEASE: ICD-10-CM

## 2023-11-27 DIAGNOSIS — Z95.2 H/O MITRAL VALVE REPLACEMENT WITH MECHANICAL VALVE: Primary | ICD-10-CM

## 2023-11-27 DIAGNOSIS — Q24.4 SUBAORTIC STENOSIS: ICD-10-CM

## 2023-11-27 DIAGNOSIS — I50.22 CHRONIC SYSTOLIC HEART FAILURE: ICD-10-CM

## 2023-11-27 DIAGNOSIS — Z87.74 H/O AORTIC COARCTATION REPAIR: ICD-10-CM

## 2023-11-27 PROCEDURE — 99213 OFFICE O/P EST LOW 20 MIN: CPT | Mod: 95,,, | Performed by: PEDIATRICS

## 2023-11-27 PROCEDURE — 99213 PR OFFICE/OUTPT VISIT, EST, LEVL III, 20-29 MIN: ICD-10-PCS | Mod: 95,,, | Performed by: PEDIATRICS

## 2023-11-28 ENCOUNTER — TELEPHONE (OUTPATIENT)
Dept: CARDIOLOGY | Facility: CLINIC | Age: 28
End: 2023-11-28
Payer: MEDICAID

## 2023-12-10 ENCOUNTER — PATIENT MESSAGE (OUTPATIENT)
Dept: PEDIATRIC CARDIOLOGY | Facility: CLINIC | Age: 28
End: 2023-12-10
Payer: MEDICAID

## 2023-12-10 ENCOUNTER — DOCUMENTATION ONLY (OUTPATIENT)
Dept: PEDIATRIC CARDIOLOGY | Facility: HOSPITAL | Age: 28
End: 2023-12-10
Payer: MEDICAID

## 2023-12-11 NOTE — PROGRESS NOTES
Blood work from 12/1/23 scanned into media.  CMP normal with creatinine 0.51, creatinine 9, K 4.7, all else normal except very mild ALT elevation at 35 (normal up to 32).  TSH and CBC normal.  BNP elevated at 147 (0-100) but NT-proBNP was 621 (0-125) August 2020.

## 2024-01-12 RX ORDER — METOPROLOL TARTRATE 25 MG/1
25 TABLET, FILM COATED ORAL 2 TIMES DAILY
Qty: 60 TABLET | Refills: 11 | Status: SHIPPED | OUTPATIENT
Start: 2024-01-12

## 2024-02-06 ENCOUNTER — PATIENT MESSAGE (OUTPATIENT)
Dept: PEDIATRIC CARDIOLOGY | Facility: CLINIC | Age: 29
End: 2024-02-06
Payer: MEDICAID

## 2024-02-07 RX ORDER — WARFARIN SODIUM 6 MG/1
TABLET ORAL
Qty: 34 TABLET | Refills: 6 | Status: SHIPPED | OUTPATIENT
Start: 2024-02-07

## 2024-03-06 ENCOUNTER — PATIENT MESSAGE (OUTPATIENT)
Dept: CARDIOLOGY | Facility: CLINIC | Age: 29
End: 2024-03-06
Payer: MEDICAID

## 2024-03-06 DIAGNOSIS — I50.22 CHRONIC SYSTOLIC HEART FAILURE: ICD-10-CM

## 2024-03-06 DIAGNOSIS — Q24.4 SUBAORTIC STENOSIS: ICD-10-CM

## 2024-03-06 DIAGNOSIS — Z87.74 H/O AORTIC COARCTATION REPAIR: ICD-10-CM

## 2024-03-06 DIAGNOSIS — Z95.2 H/O MITRAL VALVE REPLACEMENT WITH MECHANICAL VALVE: ICD-10-CM

## 2024-03-06 DIAGNOSIS — Q24.9 ADULT CONGENITAL HEART DISEASE: Primary | ICD-10-CM

## 2024-05-18 NOTE — PROGRESS NOTES
2024     re:Carlie Marcelino  :1995    Lindsay Regalado, NP  [address] [facility] [address]    Dr. Albert Guitierrez [facility]     Dear Dr. Goodson and MsKerwin Sabine:    Carlie Marcelino is a 28 y.o. female seen in my [facility] clinic today for evaluation of congenital heart disease.  To summarize her diagnoses are as follow:  1. Coarctation of the aorta status post repair 2000  - most recent catheterization   - no significant obstruction noted across coarctation repair  2. Status post resection of a supravalvar mitral ring and Primum atrial septal defect closed 2001  - no residual obstruction within the left atrium, no evidence of residual intracardiac shunting  3. Severe subaortic stenosis treated with a modified Konno procedure May 2004 followed by VSD closure 2004  - no left ventricular outflow obstruction and no significant aortic valve insufficiency  4. Severe mitral valve regurgitation necessitating mitral valve replacement with a 23 mm [place] Keith mechanical valve 2010  - trivial stenosis of the mechanical valve with 4 mm of mercury gradient noted on  catheterization  - mild to moderate tricuspid insufficiency   - no pulmonary hypertension or pulmonary vascular resistance elevation on catheterization   - currently enrolled in Coumadin Clinic at [facility] in [place]  5. Reduced left ventricular systolic function primarily due to VSD patch  - left ventricular ejection fraction around 45-55%  (dyskinetic basal septum from VSD patch plays a role - EF today was in the 50-55% range depending on view)  5. History of wide complex tachycardia/SVT   6. Distant history of seizures, last seen by Neurology at 10 years of age.    7. Pectus carinatum.  All surgeries performed at [facility].      To summarize, my recommendations are as follows:  1. Continue Coumadin with goal INR 2.5-3.5.  2.  Continue metoprolol  3. SBE prophylaxis is definitely indicated.  4. I would like to see her again in 1 year in [place] with plans for an echo and EKG.    5. Any heart failure symptoms would prompt afterload reduction, likely with Entresto.    6. Holter at next visit.    Discussion:  1. Her left ventricular ejection fraction is not normal, but this is primarily due to dyskinetic motion of the septum and her VSD patch.  Her posterior and free walls move very well.  Clinically, she is doing very well.  She certainly does not need a heart transplant, especially given her very reassuring catheterization numbers.  Given the lack of symptoms, I am going to hold off on adding any additional afterload reduction although I would be quick to consider adding Entresto if there were any concerning symptoms.  2. Her echocardiogram significantly overestimates the gradient across her mitral valve and her pulmonary artery pressures.  The catheterization done in 2022 was very reassuring with a mild mitral valve gradient and no pulmonary hypertension.    3. Her left ventricular outflow tract, aortic valve, and coarctation repair looked great.  4. We discussed pregnancy in the past.  I agree that she is a high risk pregnancy given her mechanical valve and high-dose Coumadin along with her mildly reduced function.      History of present illness:   I last saw her 6 months ago.  Since that time, she has been completely asymptomatic from a cardiovascular standpoint.  She is a very talkative and engaging woman.  She had absolutely no complaints.  She denies chest pain, dyspnea on exertion, shortness of breath, palpitations, syncope, near syncope, cyanosis, and edema.      The review of systems is as noted above. It is otherwise negative for other symptoms related to the general, neurological, psychiatric, endocrine, gastrointestinal, genitourinary, respiratory, dermatologic, musculoskeletal, hematologic, and immunologic systems.    Past  Medical History:   Diagnosis Date    Aortic stenosis     Coarctation of aorta     Mitral valve stenosis      Past Surgical History:   Procedure Laterality Date    CARDIAC SURGERY      COMBINED RIGHT AND RETROGRADE LEFT HEART CATHETERIZATION FOR CONGENITAL HEART DEFECT N/A 3/17/2022    Procedure: CATHETERIZATION, HEART, COMBINED RIGHT AND RETROGRADE LEFT, FOR CONGENITAL HEART DEFECT;  Surgeon: Sindhu Bellamy MD;  Location: [facility] CATH LAB;  Service: Cardiology;  Laterality: N/A;  Pedi Heart    MITRAL VALVE REPLACEMENT      REPAIR OF COARCTATION OF AORTA      TRANSTHORACIC ECHOCARDIOGRAPHY (TTE)  3/17/2022    Procedure: ECHOCARDIOGRAM, TRANSTHORACIC;  Surgeon: Sindhu Bellamy MD;  Location: [facility] CATH LAB;  Service: Cardiology;;     Family History   Problem Relation Name Age of Onset    No Known Problems Mother      Heart disease Father      Hypertension Father      Diabetes Father      No Known Problems Sister 4     No Known Problems Brother      No Known Problems Maternal Aunt      No Known Problems Maternal Uncle      No Known Problems Paternal Aunt      No Known Problems Paternal Uncle      No Known Problems Maternal Grandmother      No Known Problems Maternal Grandfather      No Known Problems Paternal Grandmother      No Known Problems Paternal Grandfather      No Known Problems Other      Anemia Neg Hx      Arrhythmia Neg Hx      Asthma Neg Hx      Clotting disorder Neg Hx      Fainting Neg Hx      Heart attack Neg Hx      Heart failure Neg Hx      Hyperlipidemia Neg Hx      Stroke Neg Hx      Atrial Septal Defect Neg Hx       Social History     Socioeconomic History    Marital status: Single   Tobacco Use    Smoking status: Never    Smokeless tobacco: Never   Substance and Sexual Activity    Alcohol use: Never    Drug use: Never     Current Outpatient Medications on File Prior to Visit   Medication Sig Dispense Refill    SAGAR ASPIRIN ORAL       JANTOVEN 6 mg tablet TAKE ONE (1) TABLET BY MOUTH  "ONCE DAILY AND ADD ONE HALF (1/2) TABLET ON MONDAYS AND FRIDAYS FOR A TOTAL OF ONE AND A HALF (1 & 1/2) TABS (9MGS) ON MON & FRI 34 tablet 6    metoprolol tartrate (LOPRESSOR) 25 MG tablet Take 1 tablet (25 mg total) by mouth 2 (two) times daily. 60 tablet 11     No current facility-administered medications on file prior to visit.     Review of patient's allergies indicates:  No Known Allergies     Vitals:    05/20/24 1013   BP: 132/61   BP Location: Right arm   Patient Position: Sitting   BP Method: Medium (Automatic)   Pulse: 60   Resp: 18   SpO2: 98%   Weight: 48.6 kg (107 lb 3.2 oz)   Height: 4' 10" (1.473 m)     In general, she is a small, very pleasant, very healthy-appearing nondysmorphic female in no apparent distress.  The eyes, nares, and oropharynx are clear.  Eyelids and conjunctiva are normal without drainage or erythema.  Pupils equal and round bilaterally.  The head is normocephalic and atraumatic.  The neck is supple without jugular venous distention or thyroid enlargement.  The lungs are clear to auscultation bilaterally.  There is a well-healed sternotomy incision.  A mechanical 1st heart sound is normal.  The 2nd heart sound is split.  A grade 1/6 systolic ejection murmur is heard at the upper sternal border.  I do not hear diastolic murmur at the apex.  The abdominal exam is benign without hepatosplenomegaly, tenderness, or distention.  Pulses are normal in all 4 extremities with brisk capillary refill and no clubbing, cyanosis, or edema.  No rashes are noted.    I personally reviewed the following tests performed today and my interpretation follows:  An EKG performed in clinic today reveals a superior leftward axis.  Sinus rhythm noted.  Right bundle branch block present with QRS duration about 150 milliseconds.  Single premature ventricular contraction noted.  No change    Echo today:  1. Mild left atrial enlargement  2. Mild tricuspid insufficiency  3. The aortic valve morphology was not " visualized. There is flow acceleration thet   starts proximal to the aortic valve. The LVOT/aortic valve velocity is at the   upper limits of normal with a peak velocity of 1.9 m/sec.  4. The mechanical mitral valve is well seated with a mean pressure gradient 10-12 mmHg and no significant regurgitation.   5.  The ventricular septum is echobright. Qualitatively the left ventricle is mildly dilated with basal septal   hypokinesis, good posterior wall motion and overall low normal systolic function   with EF around 55%. Qualitatively normal right ventricular size and systolic function.  5. The tricuspid regurgitant jet peak velocity estimates a right ventricular pressure of 28 mmHg above the right atrial   pressure.  6. Previously noted left aortic arch. Well repaired coarctation with very mild flow acceleration (2.1 m/s) across the repair.  No significant change from last echo.    Cath March 17, 2022:  IMPRESSION:  1) Coarctation s/p repair.  Supravalvar mitral ring resection and primum ASD closure.  Yonis for subaortic stenosis with VSD closure.  23mm St. Keith mechanical mitral valve replacement.  2) Normal PA pressure, cardiac output, and vascular resistance calculations.  3) Mean mitral valve gradient 4mmHg  4) Anomalous right subclavian artery.     Cardiac MRI 1/27/22:  Conclusion:    Normal RV volumes with RVEF of 48%  Severe left atrial enlargement.   Well-seated mechanical mitral valve.   The left ventricle is dilated with increased volumes. There is a large VSD patch and paradoxical septal wall motion. Normal LV free wall motion. The calculated LVEF is 43%.   Bicuspid aortic valve with small annulus size. Regurgitant fraction 2%, regurgitant volume 1 cc. Normal velocity of 1.6m/sec. In cine images, there is slight flow aliasing in the subaortic area.   Low normal/mildly hypoplastic aorta (root, ascending aorta, and arch) with no discrete stenosis (measurements as noted above).   Left aortic arch with unusual  branching. 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       Thank you for referring this patient to our clinic.  Please call with any questions.    Sincerely,        Aguilar Olguin MD  Pediatric Cardiology  Adult Congenital Heart Disease  Pediatric Heart Failure and Transplantation  [facility] [address] [place]  [address]  [phone]

## 2024-05-20 ENCOUNTER — OFFICE VISIT (OUTPATIENT)
Dept: PEDIATRIC CARDIOLOGY | Facility: CLINIC | Age: 29
End: 2024-05-20
Payer: MEDICAID

## 2024-05-20 VITALS
WEIGHT: 107.19 LBS | OXYGEN SATURATION: 98 % | SYSTOLIC BLOOD PRESSURE: 132 MMHG | HEIGHT: 58 IN | HEART RATE: 60 BPM | BODY MASS INDEX: 22.5 KG/M2 | DIASTOLIC BLOOD PRESSURE: 61 MMHG | RESPIRATION RATE: 18 BRPM

## 2024-05-20 DIAGNOSIS — Z95.2 H/O MITRAL VALVE REPLACEMENT WITH MECHANICAL VALVE: ICD-10-CM

## 2024-05-20 DIAGNOSIS — Z87.74 H/O AORTIC COARCTATION REPAIR: ICD-10-CM

## 2024-05-20 DIAGNOSIS — Q24.4 SUBAORTIC STENOSIS: ICD-10-CM

## 2024-05-20 DIAGNOSIS — Q24.9 ADULT CONGENITAL HEART DISEASE: ICD-10-CM

## 2024-05-20 DIAGNOSIS — I50.22 CHRONIC SYSTOLIC HEART FAILURE: ICD-10-CM

## 2024-05-20 PROCEDURE — 93000 ELECTROCARDIOGRAM COMPLETE: CPT | Mod: S$GLB,,, | Performed by: PEDIATRICS

## 2024-05-20 PROCEDURE — 3078F DIAST BP <80 MM HG: CPT | Mod: CPTII,S$GLB,, | Performed by: PEDIATRICS

## 2024-05-20 PROCEDURE — 3008F BODY MASS INDEX DOCD: CPT | Mod: CPTII,S$GLB,, | Performed by: PEDIATRICS

## 2024-05-20 PROCEDURE — 3075F SYST BP GE 130 - 139MM HG: CPT | Mod: CPTII,S$GLB,, | Performed by: PEDIATRICS

## 2024-05-20 PROCEDURE — 1159F MED LIST DOCD IN RCRD: CPT | Mod: CPTII,S$GLB,, | Performed by: PEDIATRICS

## 2024-05-20 PROCEDURE — 99214 OFFICE O/P EST MOD 30 MIN: CPT | Mod: 25,S$GLB,, | Performed by: PEDIATRICS

## 2024-05-21 LAB
OHS QRS DURATION: 166 MS
OHS QTC CALCULATION: 490 MS

## 2024-07-15 ENCOUNTER — OFFICE VISIT (OUTPATIENT)
Dept: FAMILY MEDICINE | Facility: CLINIC | Age: 29
End: 2024-07-15
Payer: MEDICAID

## 2024-07-15 VITALS
TEMPERATURE: 97 F | HEART RATE: 73 BPM | DIASTOLIC BLOOD PRESSURE: 69 MMHG | RESPIRATION RATE: 16 BRPM | WEIGHT: 107 LBS | HEIGHT: 58 IN | SYSTOLIC BLOOD PRESSURE: 117 MMHG | BODY MASS INDEX: 22.46 KG/M2

## 2024-07-15 DIAGNOSIS — M79.641 PAIN IN BOTH HANDS: Primary | ICD-10-CM

## 2024-07-15 DIAGNOSIS — M79.642 PAIN IN BOTH HANDS: Primary | ICD-10-CM

## 2024-07-15 DIAGNOSIS — Z87.74 H/O AORTIC COARCTATION REPAIR: ICD-10-CM

## 2024-07-15 PROCEDURE — 99213 OFFICE O/P EST LOW 20 MIN: CPT | Mod: ,,, | Performed by: NURSE PRACTITIONER

## 2024-07-15 PROCEDURE — 3074F SYST BP LT 130 MM HG: CPT | Mod: CPTII,,, | Performed by: NURSE PRACTITIONER

## 2024-07-15 PROCEDURE — 1159F MED LIST DOCD IN RCRD: CPT | Mod: CPTII,,, | Performed by: NURSE PRACTITIONER

## 2024-07-15 PROCEDURE — 3008F BODY MASS INDEX DOCD: CPT | Mod: CPTII,,, | Performed by: NURSE PRACTITIONER

## 2024-07-15 PROCEDURE — 3078F DIAST BP <80 MM HG: CPT | Mod: CPTII,,, | Performed by: NURSE PRACTITIONER
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
## 2024-07-24 ENCOUNTER — CLINICAL SUPPORT (OUTPATIENT)
Dept: FAMILY MEDICINE | Facility: CLINIC | Age: 29
End: 2024-07-24
Payer: MEDICAID

## 2024-07-24 DIAGNOSIS — M79.642 PAIN IN BOTH HANDS: Primary | ICD-10-CM

## 2024-07-24 DIAGNOSIS — M79.641 PAIN IN BOTH HANDS: Primary | ICD-10-CM

## 2024-07-24 LAB
ALBUMIN SERPL-MCNC: 4.4 G/DL (ref 3.5–5)
ALBUMIN/GLOB SERPL: 1.4 RATIO (ref 1.1–2)
ALP SERPL-CCNC: 109 UNIT/L (ref 40–150)
ALT SERPL-CCNC: 95 UNIT/L (ref 0–55)
ANION GAP SERPL CALC-SCNC: 11 MEQ/L
AST SERPL-CCNC: 71 UNIT/L (ref 5–34)
BASOPHILS # BLD AUTO: 0.03 X10(3)/MCL
BASOPHILS NFR BLD AUTO: 0.7 %
BILIRUB SERPL-MCNC: 0.4 MG/DL
BUN SERPL-MCNC: 12 MG/DL (ref 7–18.7)
CALCIUM SERPL-MCNC: 9.9 MG/DL (ref 8.4–10.2)
CHLORIDE SERPL-SCNC: 106 MMOL/L (ref 98–107)
CHOLEST SERPL-MCNC: 172 MG/DL
CHOLEST/HDLC SERPL: 4 {RATIO} (ref 0–5)
CO2 SERPL-SCNC: 25 MMOL/L (ref 22–29)
CREAT SERPL-MCNC: 0.72 MG/DL (ref 0.55–1.02)
CREAT/UREA NIT SERPL: 17
EOSINOPHIL # BLD AUTO: 0.11 X10(3)/MCL (ref 0–0.9)
EOSINOPHIL NFR BLD AUTO: 2.6 %
ERYTHROCYTE [DISTWIDTH] IN BLOOD BY AUTOMATED COUNT: 12.8 % (ref 11.5–17)
GFR SERPLBLD CREATININE-BSD FMLA CKD-EPI: >60 ML/MIN/1.73/M2
GLOBULIN SER-MCNC: 3.1 GM/DL (ref 2.4–3.5)
GLUCOSE SERPL-MCNC: 76 MG/DL (ref 74–100)
HCT VFR BLD AUTO: 43.8 % (ref 37–47)
HDLC SERPL-MCNC: 49 MG/DL (ref 35–60)
HGB BLD-MCNC: 13.9 G/DL (ref 12–16)
IMM GRANULOCYTES # BLD AUTO: 0.01 X10(3)/MCL (ref 0–0.04)
IMM GRANULOCYTES NFR BLD AUTO: 0.2 %
LDLC SERPL CALC-MCNC: 106 MG/DL (ref 50–140)
LYMPHOCYTES # BLD AUTO: 1.2 X10(3)/MCL (ref 0.6–4.6)
LYMPHOCYTES NFR BLD AUTO: 28 %
MCH RBC QN AUTO: 29.5 PG (ref 27–31)
MCHC RBC AUTO-ENTMCNC: 31.7 G/DL (ref 33–36)
MCV RBC AUTO: 93 FL (ref 80–94)
MONOCYTES # BLD AUTO: 0.49 X10(3)/MCL (ref 0.1–1.3)
MONOCYTES NFR BLD AUTO: 11.4 %
NEUTROPHILS # BLD AUTO: 2.45 X10(3)/MCL (ref 2.1–9.2)
NEUTROPHILS NFR BLD AUTO: 57.1 %
NRBC BLD AUTO-RTO: 0 %
PLATELET # BLD AUTO: 225 X10(3)/MCL (ref 130–400)
PLATELET # BLD EST: ADEQUATE 10*3/UL
PMV BLD AUTO: 13.1 FL (ref 7.4–10.4)
POTASSIUM SERPL-SCNC: 4.8 MMOL/L (ref 3.5–5.1)
PROT SERPL-MCNC: 7.5 GM/DL (ref 6.4–8.3)
RBC # BLD AUTO: 4.71 X10(6)/MCL (ref 4.2–5.4)
SODIUM SERPL-SCNC: 142 MMOL/L (ref 136–145)
TRIGL SERPL-MCNC: 87 MG/DL (ref 37–140)
VLDLC SERPL CALC-MCNC: 17 MG/DL
WBC # BLD AUTO: 4.29 X10(3)/MCL (ref 4.5–11.5)

## 2024-07-24 PROCEDURE — 80061 LIPID PANEL: CPT | Performed by: NURSE PRACTITIONER

## 2024-07-24 PROCEDURE — 86803 HEPATITIS C AB TEST: CPT | Performed by: NURSE PRACTITIONER

## 2024-07-24 PROCEDURE — 86431 RHEUMATOID FACTOR QUANT: CPT | Performed by: NURSE PRACTITIONER

## 2024-07-24 PROCEDURE — 87389 HIV-1 AG W/HIV-1&-2 AB AG IA: CPT | Performed by: NURSE PRACTITIONER

## 2024-07-24 PROCEDURE — 80053 COMPREHEN METABOLIC PANEL: CPT | Performed by: NURSE PRACTITIONER

## 2024-07-24 PROCEDURE — 36415 COLL VENOUS BLD VENIPUNCTURE: CPT

## 2024-07-24 PROCEDURE — 85025 COMPLETE CBC W/AUTO DIFF WBC: CPT | Performed by: NURSE PRACTITIONER

## 2024-07-24 PROCEDURE — 86039 ANTINUCLEAR ANTIBODIES (ANA): CPT | Performed by: NURSE PRACTITIONER

## 2024-07-25 LAB
HCV AB SERPL QL IA: NONREACTIVE
HIV 1+2 AB+HIV1 P24 AG SERPL QL IA: NONREACTIVE
RHEUMATOID FACT SERPL-ACNC: <13 IU/ML

## 2024-07-26 LAB — ANA SER QL HEP2 SUBST: NORMAL

## 2024-07-29 ENCOUNTER — OFFICE VISIT (OUTPATIENT)
Dept: FAMILY MEDICINE | Facility: CLINIC | Age: 29
End: 2024-07-29
Payer: MEDICAID

## 2024-07-29 VITALS
SYSTOLIC BLOOD PRESSURE: 120 MMHG | RESPIRATION RATE: 20 BRPM | WEIGHT: 106.94 LBS | TEMPERATURE: 98 F | HEART RATE: 63 BPM | BODY MASS INDEX: 22.45 KG/M2 | DIASTOLIC BLOOD PRESSURE: 65 MMHG | HEIGHT: 58 IN

## 2024-07-29 DIAGNOSIS — M25.532 LEFT WRIST PAIN: ICD-10-CM

## 2024-07-29 DIAGNOSIS — M25.531 RIGHT WRIST PAIN: ICD-10-CM

## 2024-07-29 PROCEDURE — 3078F DIAST BP <80 MM HG: CPT | Mod: CPTII,,, | Performed by: NURSE PRACTITIONER

## 2024-07-29 PROCEDURE — 99214 OFFICE O/P EST MOD 30 MIN: CPT | Mod: ,,, | Performed by: NURSE PRACTITIONER

## 2024-07-29 PROCEDURE — 1159F MED LIST DOCD IN RCRD: CPT | Mod: CPTII,,, | Performed by: NURSE PRACTITIONER

## 2024-07-29 PROCEDURE — 1160F RVW MEDS BY RX/DR IN RCRD: CPT | Mod: CPTII,,, | Performed by: NURSE PRACTITIONER

## 2024-07-29 PROCEDURE — 3008F BODY MASS INDEX DOCD: CPT | Mod: CPTII,,, | Performed by: NURSE PRACTITIONER

## 2024-07-29 PROCEDURE — 3074F SYST BP LT 130 MM HG: CPT | Mod: CPTII,,, | Performed by: NURSE PRACTITIONER

## 2024-07-29 RX ORDER — DEXTROMETHORPHAN HYDROBROMIDE, GUAIFENESIN 5; 100 MG/5ML; MG/5ML
650 LIQUID ORAL DAILY
COMMUNITY

## 2024-07-29 NOTE — PROGRESS NOTES
"Subjective:       Patient ID: Carlie Marcelino is a 28 y.o. female.    Chief Complaint: Results (Lab results)      The patient has wrist pain that goes down into her and makes her fingers lock.  She states she did not have prior to fracturing bilateral wrists in 2018.  She did not have to have surgical repair but had bilateral casts.  Patient has a history of coarctation of the aorta, a birth defect that was not caught until she went to school.  She had surgical repair and is continuing to see her cardiologist from childhood.  She is currently on warfarin, aspirin and metoprolol.      Review of [id number] point review of systems conducted, negative except as stated in the history of present illness. See HPI for details.      Objective:      Visit Vitals  /65   Pulse 63   Temp 98.2 °F (36.8 °C)   Resp 20   Ht 4' 10" (1.473 m)   Wt 48.5 kg (106 lb 14.8 oz)   LMP 07/25/2024   BMI 22.35 kg/m²     Physical Exam  Vitals and nursing note reviewed.   HENT:      Head: Normocephalic.      Nose: Nose normal.      Mouth/Throat:      Mouth: Mucous membranes are moist.   Eyes:      Extraocular Movements: Extraocular movements intact.   Cardiovascular:      Rate and Rhythm: Normal rate and regular rhythm.      Pulses: Normal pulses.      Heart sounds: No murmur heard.  Pulmonary:      Effort: Pulmonary effort is normal.      Breath sounds: Normal breath sounds.   Abdominal:      General: Bowel sounds are normal.      Palpations: Abdomen is soft.   Musculoskeletal:         General: Normal range of motion.      Cervical back: Normal range of motion and neck supple.      Comments: Bilateral wrist with range of motion on flexion lateral movement.  Bilateral radial pulses are +2   Skin:     General: Skin is warm and dry.   Neurological:      Mental Status: She is alert and oriented to person, place, and time.       Current Outpatient Medications   Medication Instructions    acetaminophen (TYLENOL) 650 mg, Oral, Daily    SAGAR " ASPIRIN ORAL     JANTOVEN 6 mg tablet TAKE ONE (1) TABLET BY MOUTH ONCE DAILY AND ADD ONE HALF (1/2) TABLET ON MONDAYS AND FRIDAYS FOR A TOTAL OF ONE AND A HALF (1 & 1/2) TABS (9MGS) ON MON & FRI    metoprolol tartrate (LOPRESSOR) 25 mg, Oral, 2 times daily     has No Known Allergies.       Assessment:         ICD-10-CM ICD-9-CM   1. Right wrist pain  M25.531 719.43   2. Left wrist pain  M25.532 719.43          Plan:       1. Right wrist pain  - X-Ray Wrist Complete Right; Future-call patient results  Possible MRI depending on results    2. Left wrist pain  - X-Ray Wrist Complete Left; Future-call patient results  Possible MRI depending on results            Follow up in about 3 months (around 10/29/2024).     Future Appointments   Date Time Provider Department Center   10/29/2024  1:30 PM Lindsay Regalado NP [facility]

## 2024-07-30 ENCOUNTER — HOSPITAL ENCOUNTER (OUTPATIENT)
Dept: RADIOLOGY | Facility: HOSPITAL | Age: 29
Discharge: HOME | End: 2024-07-30
Attending: NURSE PRACTITIONER
Payer: MEDICAID

## 2024-07-30 DIAGNOSIS — M25.532 LEFT WRIST PAIN: ICD-10-CM

## 2024-07-30 DIAGNOSIS — M25.531 RIGHT WRIST PAIN: ICD-10-CM

## 2024-07-30 PROCEDURE — 73110 X-RAY EXAM OF WRIST: CPT | Mod: TC,RT

## 2024-07-30 PROCEDURE — 73110 X-RAY EXAM OF WRIST: CPT | Mod: TC,LT

## 2024-07-31 DIAGNOSIS — M25.531 RIGHT WRIST PAIN: ICD-10-CM

## 2024-07-31 DIAGNOSIS — M25.532 LEFT WRIST PAIN: Primary | ICD-10-CM

## 2024-07-31 NOTE — PROGRESS NOTES
I called the patient and spoke with her about her XRs.  The x-rays were negative, only showing osteopenia 1 of her wrists.  I will order an MRI but I told the patient her insurance may not approve it and we may have to do six weeks of physical therapy 1st patient verbalized understanding

## 2024-08-29 ENCOUNTER — OFFICE VISIT (OUTPATIENT)
Dept: FAMILY MEDICINE | Facility: CLINIC | Age: 29
End: 2024-08-29
Payer: MEDICAID

## 2024-08-29 VITALS
BODY MASS INDEX: 22.25 KG/M2 | RESPIRATION RATE: 20 BRPM | DIASTOLIC BLOOD PRESSURE: 69 MMHG | SYSTOLIC BLOOD PRESSURE: 115 MMHG | TEMPERATURE: 98 F | WEIGHT: 106 LBS | HEART RATE: 69 BPM | HEIGHT: 58 IN

## 2024-08-29 DIAGNOSIS — M25.532 PAIN IN BOTH WRISTS: ICD-10-CM

## 2024-08-29 DIAGNOSIS — M25.531 PAIN IN BOTH WRISTS: ICD-10-CM

## 2024-08-29 PROCEDURE — 1159F MED LIST DOCD IN RCRD: CPT | Mod: CPTII,,, | Performed by: NURSE PRACTITIONER

## 2024-08-29 PROCEDURE — 3078F DIAST BP <80 MM HG: CPT | Mod: CPTII,,, | Performed by: NURSE PRACTITIONER

## 2024-08-29 PROCEDURE — 3008F BODY MASS INDEX DOCD: CPT | Mod: CPTII,,, | Performed by: NURSE PRACTITIONER

## 2024-08-29 PROCEDURE — 3074F SYST BP LT 130 MM HG: CPT | Mod: CPTII,,, | Performed by: NURSE PRACTITIONER

## 2024-08-29 PROCEDURE — 99214 OFFICE O/P EST MOD 30 MIN: CPT | Mod: ,,, | Performed by: NURSE PRACTITIONER

## 2024-08-29 NOTE — PROGRESS NOTES
"Subjective:       Patient ID: Carlie Marcelino is a 29 y.o. female.    Chief Complaint: Wrist Pain (1 month follow up)      The patient has bilateral wrist pain that goes down into her hands and makes her fingers lock.  She states she did have prior fracturing bilateral wrists in 2018.     Patient has a history of coarctation of the aorta, a birth defect that was not caught until she went to school.  She had surgical repair and is continuing to see her cardiologist from childhood.  She is currently on warfarin, aspirin and metoprolol.         Wrist Pain   The pain is present in the left wrist and right wrist. This is a chronic problem. The current episode started more than 1 year ago. There has been a history of trauma. The problem occurs intermittently. The problem has been gradually worsening. The quality of the pain is described as sharp and aching. The pain is at a severity of 7/10. The pain is moderate. The symptoms are aggravated by activity. She has tried acetaminophen for the symptoms. The treatment provided significant relief.     Review of [id number] point review of systems conducted, negative except as stated in the history of present illness. See HPI for details.      Objective:      Visit Vitals  /69   Pulse 69   Temp 97.7 °F (36.5 °C)   Resp 20   Ht 4' 10" (1.473 m)   Wt 48.1 kg (106 lb)   LMP 08/19/2024   BMI 22.15 kg/m²     Physical Exam  Vitals and nursing note reviewed.   HENT:      Head: Normocephalic.      Right Ear: Tympanic membrane normal.      Left Ear: Tympanic membrane normal.      Nose: Nose normal.      Mouth/Throat:      Mouth: Mucous membranes are moist.   Eyes:      Extraocular Movements: Extraocular movements intact.   Cardiovascular:      Rate and Rhythm: Normal rate and regular rhythm.      Heart sounds: No murmur heard.  Pulmonary:      Effort: Pulmonary effort is normal.      Breath sounds: Normal breath sounds.   Abdominal:      General: Bowel sounds are normal.      " Palpations: Abdomen is soft.   Musculoskeletal:         General: Normal range of motion.      Cervical back: Normal range of motion and neck supple.   Skin:     General: Skin is warm and dry.   Neurological:      Mental Status: She is alert and oriented to person, place, and time.       Current Outpatient Medications   Medication Instructions    acetaminophen (TYLENOL) 650 mg, Oral, Daily    SAGAR ASPIRIN ORAL     JANTOVEN 6 mg tablet TAKE ONE (1) TABLET BY MOUTH ONCE DAILY AND ADD ONE HALF (1/2) TABLET ON MONDAYS AND FRIDAYS FOR A TOTAL OF ONE AND A HALF (1 & 1/2) TABS (9MGS) ON MON & FRI    metoprolol tartrate (LOPRESSOR) 25 mg, Oral, 2 times daily     has No Known Allergies.       Assessment:         ICD-10-CM ICD-9-CM   1. Pain in both wrists  M25.531 719.43    M25.532           Plan:       1. Pain in both wrists  Continue take Tylenol as directed on the label for wrist pain  - Ambulatory referral/consult to Physical/Occupational Therapy; Future (6 weeks' duration)        Follow up in about 6 weeks (around 10/10/2024).     No future appointments.

## 2024-09-09 ENCOUNTER — CLINICAL SUPPORT (OUTPATIENT)
Dept: REHABILITATION | Facility: HOSPITAL | Age: 29
End: 2024-09-09
Payer: MEDICAID

## 2024-09-09 DIAGNOSIS — M25.531 PAIN IN BOTH WRISTS: ICD-10-CM

## 2024-09-09 DIAGNOSIS — M25.532 PAIN IN BOTH WRISTS: ICD-10-CM

## 2024-09-09 PROCEDURE — 97166 OT EVAL MOD COMPLEX 45 MIN: CPT
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
## 2024-09-16 ENCOUNTER — CLINICAL SUPPORT (OUTPATIENT)
Dept: REHABILITATION | Facility: HOSPITAL | Age: 29
End: 2024-09-16
Payer: MEDICAID

## 2024-09-16 DIAGNOSIS — M25.532 PAIN IN BOTH WRISTS: Primary | ICD-10-CM

## 2024-09-16 DIAGNOSIS — M25.531 PAIN IN BOTH WRISTS: Primary | ICD-10-CM

## 2024-09-16 PROCEDURE — 97530 THERAPEUTIC ACTIVITIES: CPT

## 2024-09-17 NOTE — PROGRESS NOTES
Occupational Therapy  Occupational Therapy Outpatient Progress Note      Date: 2024  Name: Carlie Marcelino  Clinic Number: [id number]  : 1995  Visit Number: 2      Subjective    Carlie in good spirits without pain reports at rest.    Patient's response to therapy: Carlie tolerated treatment well with good effort.         Objective     Current condition: Carlie presents with BUE weakness with occasional hand pain effecting IADL abilities.      TREATMENT    Principle of treatment/treatment today:  Pain management, BUE mobilization, BUE strengthening ex's, and education on HEP.      Modalities: Paraffin applied to bilateral hands/wrists 6 mins, moist heat applied to bilateral shoulders 10 mins.    Manual Therapy: While paraffin applied, performed bilateral forearm myofascial stretching with trigger point releasing.  Removed paraffin, performed bilateral wrist mobilization with end range stretching to tolerance. Removed moist heat, performed supine bilateral scapular mobilization, glenohumeral glides, and trigger point releasing to upper traps and scapular musculature.  Performed supine median, ulnar, and radial nerve stretches.       Therapeutic Exercise:   Exercise Sets Reps Comments   BUE AROM/proximal strengthening with wall slides 2 10 Performed wall slides (plus) with min vc's after demonstration.   Bilateral hand strengthening with soft resistance theraputty. 1 10 Performed gross grasp, palmar rolls, and lateral pinch pulls.                 Education:  Pt educated on pectoral chest stretches using wall corner.  Pt able to demonstrate with min vc's after demonstration.  Issued pt soft resistance theraputty and educated on bilateral hand strengthening HEP.  Pt able to perform with 2 vc's after demonstration.       Time In: 1315  Time Out: 1400  Total Appointment Time (timed & untimed codes): 45 minutes   Treatment time: Therapeutic Activity: 45 minutes.      Assessment    Summary/Analysis of  session:   Pt seen in clinic setting.  Pt on time and well groomed.  Noted pt with tight pectoral muscles effecting scapular mobility.  Educated pt on pectoral stretches and bilateral hand HEP with theraputty.  Pt reported noting a positive difference with bilateral shoulder mobility after session.  Recommend continuing POC to address BUE deficits.       Progress toward previous goals: Continue STG/LTG      Goals:      LTG:  (Complete in 6 weeks)   Increase bilateral wrist AROM and  strength to be able to open a container without difficulty. (Progressing)  Increase score on the FOTO outcome measure to 73 or better.(Progressing)     STG: (Complete in 3 weeks)   Increase bilateral  strength 10# to be able to open a container with minimal difficulty. (Progressing)  Increase score on the FOTO outcome measure to 71 or greater. (Progressing)  pt will be educated on a HEP and perform independently. (Progressing)    Out come measure using FOTO Form:  Evaluation:  FS score 69      Plan    OT to focus next treatment session on pain management, increasing BUE AROM/strength, and review of HEP.    Follow Up  Follow up in: 2 days

## 2024-09-19 RX ORDER — WARFARIN SODIUM 6 MG/1
TABLET ORAL
Qty: 34 TABLET | Refills: 6 | Status: SHIPPED | OUTPATIENT
Start: 2024-09-19

## 2024-09-24 ENCOUNTER — PATIENT MESSAGE (OUTPATIENT)
Dept: FAMILY MEDICINE | Facility: CLINIC | Age: 29
End: 2024-09-24
Payer: MEDICAID

## 2024-09-25 ENCOUNTER — CLINICAL SUPPORT (OUTPATIENT)
Dept: REHABILITATION | Facility: HOSPITAL | Age: 29
End: 2024-09-25
Payer: MEDICAID

## 2024-09-25 DIAGNOSIS — M25.532 PAIN IN BOTH WRISTS: Primary | ICD-10-CM

## 2024-09-25 DIAGNOSIS — M25.531 PAIN IN BOTH WRISTS: Primary | ICD-10-CM

## 2024-09-25 PROCEDURE — 97530 THERAPEUTIC ACTIVITIES: CPT

## 2024-09-26 NOTE — PROGRESS NOTES
Occupational Therapy  Occupational Therapy Outpatient Progress Note      Date: 2024  Name: Carlie Marcelino  Clinic Number: [id number]  : 1995  Visit Number: 3      Subjective    Carlie in good spirits without pain reports at rest and with activity.  She reports performing her HEP daily as directed and has noted and improvement in bilateral shoulder mobility.    Patient's response to therapy: Carlie tolerated treatment well with good effort.         Objective     Current condition: Carlie presents with BUE weakness with occasional hand pain effecting IADL abilities.      TREATMENT    Principle of treatment/treatment today:  Pain management, BUE mobilization, BUE strengthening ex's, and education on HEP.      Modalities: Paraffin applied to bilateral hands/wrists 6 mins, moist heat applied to bilateral shoulders 10 mins.    Manual Therapy: While paraffin applied, performed bilateral forearm myofascial stretching with trigger point releasing.  Removed paraffin, performed bilateral wrist mobilization with end range stretching to tolerance. Removed moist heat, performed supine bilateral scapular mobilization, glenohumeral glides, and trigger point releasing to upper traps and scapular musculature.  Performed supine median, ulnar, and radial nerve stretches.       Therapeutic Exercise:   Exercise Sets Reps Comments   Supine bilateral posterior shoulder strengthening ex with light resistance theraband 2 10 Performed horizontal ab/ad with min vc's after demonstration   Bilateral RTC strengthening ex with 1# dumbbell 2 10 In side lying position, performed ER with 1# dumbbell left then right.    Seated bilateral wrist/forearm strengthening with 1# dumbbell over towel roll 2 10 Performed wrist extension then sup/pron.   Bilateral hand strengthening reaching and placing graded clothes pins 2 5 pins of 5 sets 2-15# resistance Performed tripod then lateral pinch pattern alternating left/right hands.      Education:  Reinforced pectoral chest stretches using wall corner and hand strengthening HEP with soft resistance theraputty.     Out come measure using FOTO Form:  Evaluation:  FS score 69    Time In: 1330  Time Out: 1425  Total Appointment Time (timed & untimed codes): 55 minutes   Treatment time: Therapeutic Activity: 55 minutes.      Assessment    Summary/Analysis of session:   Pt seen in clinic setting.  Pt on time and well groomed.  Noted definite improvement in bilateral shoulder mobility with reduction in pectoral tightness.  Carlie reporting a reduction in bilateral hand cramping when performing IADL activities. Recommend continuing POC to address BUE deficits.       Progress toward previous goals: Continue STG/LTG      Goals:      LTG:  (Complete in 6 weeks)   Increase bilateral wrist AROM and  strength to be able to open a container without difficulty. (Progressing)  Increase score on the FOTO outcome measure to 73 or better.(Progressing)     STG: (Complete in 3 weeks)   Increase bilateral  strength 10# to be able to open a container with minimal difficulty. (Progressing)  Increase score on the FOTO outcome measure to 71 or greater. (Progressing)  pt will be educated on a HEP and perform independently. (Progressing)    Out come measure using FOTO Form:  Evaluation:  FS score 69      Plan    OT to focus next treatment session on pain management, increasing BUE AROM/strength, and review of HEP.    Follow Up  Follow up in: 5 days

## 2024-09-30 ENCOUNTER — CLINICAL SUPPORT (OUTPATIENT)
Dept: REHABILITATION | Facility: HOSPITAL | Age: 29
End: 2024-09-30
Payer: MEDICAID

## 2024-09-30 DIAGNOSIS — M25.532 PAIN IN BOTH WRISTS: Primary | ICD-10-CM

## 2024-09-30 DIAGNOSIS — M25.531 PAIN IN BOTH WRISTS: Primary | ICD-10-CM

## 2024-09-30 PROCEDURE — 97530 THERAPEUTIC ACTIVITIES: CPT

## 2024-10-01 NOTE — PROGRESS NOTES
Occupational Therapy  Occupational Therapy Outpatient Progress Note      Date: 2024  Name: Carlie Marcelino  Clinic Number: [id number]  : 1995  Visit Number: 4      Subjective    Carlie in good spirits without pain reports at rest and with activity.  She reports performing her HEP daily as directed and has noted and improvement in bilateral wrist flexibility and hand strength.    Patient's response to therapy: Carlie tolerated treatment well with good effort.         Objective     Current condition: Carlie presents with BUE weakness with occasional hand pain effecting IADL abilities.      TREATMENT    Principle of treatment/treatment today:  Pain management, BUE mobilization, BUE strengthening ex's, and education on HEP.      Modalities: Paraffin applied to bilateral hands/wrists 6 mins, moist heat applied to bilateral shoulders 10 mins.    Manual Therapy: While paraffin applied, performed bilateral forearm myofascial stretching with trigger point releasing.  Removed paraffin, performed bilateral wrist mobilization with end range stretching to tolerance. Removed moist heat, performed supine bilateral scapular mobilization, glenohumeral glides, and trigger point releasing to upper traps and scapular musculature.        Therapeutic Exercise:   Exercise Sets Reps Comments   Standing BUE strengthening ex's with light resistance theraband. 2 10 Educated pt on HEP, performed resisted standing rows, scapular retraction/depression, and ER. Pt able to perform with min vc's after demonstration.   Seated bilateral wrist/forearm strengthening with 1# dumbbell over towel roll 3 10 Performed wrist extension then sup/pron.   Bilateral hand strengthening/FMC activity with medium soft resistance therputty and beads. 1 10 small beads. Pt removed 10 small beads from therputty using lateral, tripod, and tip to tip pinch patterns.     Education:  Educated pt on new BUE strengthening HEP with light resistance theraband.   Pt able to perform with min vc's after demonstration, handouts given.    Out come measure using FOTO Form:  Evaluation:  FS score 69    Time In: 1325  Time Out: 1410  Total Appointment Time (timed & untimed codes): 45 minutes   Treatment time: Therapeutic Activity: 45 minutes.      Assessment    Summary/Analysis of session:   Pt seen in clinic setting.  Pt on time and well groomed.  Continue to note improvement in bilateral shoulder mobility with reduction in pectoral tightness.  Carlie reporting a reduction in bilateral hand cramping and wrist flexibility when performing IADL activities. Recommend continuing POC to address BUE deficits.       Progress toward previous goals: Continue STG/LTG      Goals:      LTG:  (Complete in 6 weeks)   Increase bilateral wrist AROM and  strength to be able to open a container without difficulty. (Progressing)  Increase score on the FOTO outcome measure to 73 or better.(Progressing)     STG: (Complete in 3 weeks)   Increase bilateral  strength 10# to be able to open a container with minimal difficulty. (Progressing)  Increase score on the FOTO outcome measure to 71 or greater. (Progressing)  pt will be educated on a HEP and perform independently. (Progressing)    Out come measure using FOTO Form:  Evaluation:  FS score 69      Plan    OT to focus next treatment session on pain management, increasing BUE AROM/strength, and review of HEP.    Follow Up  Follow up in: 2 days

## 2024-10-02 ENCOUNTER — CLINICAL SUPPORT (OUTPATIENT)
Dept: REHABILITATION | Facility: HOSPITAL | Age: 29
End: 2024-10-02
Payer: MEDICAID

## 2024-10-02 DIAGNOSIS — M25.532 PAIN IN BOTH WRISTS: Primary | ICD-10-CM

## 2024-10-02 DIAGNOSIS — M25.531 PAIN IN BOTH WRISTS: Primary | ICD-10-CM

## 2024-10-02 PROCEDURE — 97530 THERAPEUTIC ACTIVITIES: CPT

## 2024-10-03 NOTE — PROGRESS NOTES
Occupational Therapy  Occupational Therapy Outpatient Progress Note      Date: 10/2/2024  Name: Carlie Marcelino  Clinic Number: [id number]  : 1995  Visit Number: 5      Subjective    Carlie in good spirits without pain reports at rest and with activity.  She reports performing her HEP daily as prescribed.    Patient's response to therapy: Carlie tolerated treatment well with good effort.         Objective     Current condition: Carlie presents with BUE weakness with occasional hand pain effecting IADL abilities.      TREATMENT    Principle of treatment/treatment today:  Pain management, BUE mobilization, BUE strengthening ex's, and education on HEP.      Modalities: Paraffin applied to bilateral hands/wrists 6 mins, moist heat applied to bilateral shoulders 10 mins.    Manual Therapy: While paraffin applied, performed bilateral forearm myofascial stretching with trigger point releasing.  Removed paraffin, performed bilateral wrist mobilization with end range stretching to tolerance. Removed moist heat, performed supine bilateral scapular mobilization, glenohumeral glides, and trigger point releasing to upper traps and scapular musculature.        Therapeutic Exercise:   Exercise Sets Reps Comments   Standing BUE strengthening ex's with light resistance theraband. 3 10 Reviewed HEP with light resistance theraband.  Pt able to recall 3/3 ex's and required 3 verbal cues for proper techniques to prevent muscle substitution.   Standing BUE ex's with 1# dumbbell 2 10 Performed bilateral biceps curls with shoulder press in scaption plane.  pt able to perform with min vc's    Bilateral hand/forearm strengthening activity with dowels on Velcro board. 5 15 rotations ea ex Pt performed resisted sup/pron 2 set with lateral pinch pattern, then 3 sets with gross grasp     Education:  Reviewed HEP and pt able to recall 3/3 ex's and required 3 verbal cues for proper techniques to prevent muscle substitution.    Out  come measure using FOTO Form:  Evaluation:  FS score 69    Time In: 1330  Time Out: 1420  Total Appointment Time (timed & untimed codes): 50 minutes   Treatment time: Therapeutic Activity: 50 minutes.      Assessment    Summary/Analysis of session:   Pt seen in clinic setting.  Pt on time and well groomed.  Continue to note improvement in bilateral shoulder mobility with reduction in upper trap stiffness.  Carlie continues to report a reduction in bilateral hand cramping and wrist flexibility when performing IADL activities. Recommend continuing POC to address BUE deficits.       Progress toward previous goals: Continue STG/LTG      Goals:      LTG:  (Complete in 6 weeks)   Increase bilateral wrist AROM and  strength to be able to open a container without difficulty. (Progressing)  Increase score on the FOTO outcome measure to 73 or better.(Progressing)     STG: (Complete in 3 weeks)   Increase bilateral  strength 10# to be able to open a container with minimal difficulty. (Progressing)  Increase score on the FOTO outcome measure to 71 or greater. (Progressing)  pt will be educated on a HEP and perform independently. (Progressing)    Out come measure using FOTO Form:  Evaluation:  FS score 69      Plan    OT to focus next treatment session on pain management, increasing BUE AROM/strength, and review/modification of HEP.    Follow Up  Follow up in: 5 days

## 2024-10-07 ENCOUNTER — CLINICAL SUPPORT (OUTPATIENT)
Dept: REHABILITATION | Facility: HOSPITAL | Age: 29
End: 2024-10-07
Payer: MEDICAID

## 2024-10-07 DIAGNOSIS — M25.532 PAIN IN BOTH WRISTS: Primary | ICD-10-CM

## 2024-10-07 DIAGNOSIS — M25.531 PAIN IN BOTH WRISTS: Primary | ICD-10-CM

## 2024-10-07 PROCEDURE — 97530 THERAPEUTIC ACTIVITIES: CPT

## 2024-10-08 NOTE — PROGRESS NOTES
Occupational Therapy  Occupational Therapy Outpatient Progress Note      Date: 10/7/2024  Name: Carlie Marcelino  Clinic Number: [id number]  : 1995  Visit Number: 6      Subjective    Carlie in good spirits without pain reports at rest and with activity.  She continues to report noting steady improvement in BUE strength/endurance when performing IADLs.  She reports performing her HEP daily as prescribed.    Patient's response to therapy: Carlie tolerated treatment well with good effort.         Objective     Current condition: Carlie presents with BUE weakness with occasional hand pain effecting IADL abilities.      TREATMENT    Principle of treatment/treatment today:  Pain management, BUE mobilization, BUE strengthening ex's, and review of HEP.      Modalities: Paraffin applied to bilateral hands/wrists 6 mins, moist heat applied to bilateral shoulders 10 mins.    Manual Therapy: While paraffin applied, performed bilateral forearm myofascial stretching with trigger point releasing.  Removed paraffin, performed bilateral wrist mobilization with end range stretching to tolerance. Removed moist heat, performed supine bilateral scapular mobilization, glenohumeral glides, and trigger point releasing to upper traps and scapular musculature.        Therapeutic Exercise:   Exercise Sets Reps Comments   Standing proximal strengthening with 1# wrist weights 2 10 Performed alternating wall slides (plus) then resisted alternating horizontal ad/ab with light resistance band   Proximal strengthening activity in quadriped, reaching and placing clothes pins 2 5 clothes pins 5 sets 2-15# resistance While in quadriped, pt alternated reach forward to place grades clothes pins using tripod pinch pattern 2 reps.   Bilateral hand strengthening with gripper set at 25# resistance. 2 15 Performed gross grasp.     Education:  Pt instructed to increase set of HEP ex's.  Pt verbalized understanding.    Out come measure using FOTO  Form:  Evaluation:  FS score 69    Time In: 1330  Time Out: 1415  Total Appointment Time (timed & untimed codes): 45 minutes   Treatment time: Therapeutic Activity: 45 minutes.      Assessment    Summary/Analysis of session:   Pt seen in clinic setting.  Pt on time and well groomed.  Continue to note improvement in bilateral shoulder mobility with reduction in upper trap stiffness.  Carlie continues to report increase in BUE strength and endurance when performing IADL activities.  Recommend continuing POC to address BUE deficits.       Progress toward previous goals: Continue STG/LTG      Goals:      LTG:  (Complete in 6 weeks)   Increase bilateral wrist AROM and  strength to be able to open a container without difficulty. (Progressing)  Increase score on the FOTO outcome measure to 73 or better.(Progressing)     STG: (Complete in 3 weeks)   Increase bilateral  strength 10# to be able to open a container with minimal difficulty. (Progressing)  Increase score on the FOTO outcome measure to 71 or greater. (Progressing)  pt will be educated on a HEP and perform independently. (Progressing)    Out come measure using FOTO Form:  Evaluation:  FS score 69      Plan    OT to focus next treatment session on pain management, increasing BUE AROM/strength, and review/modification of HEP.    Follow Up  Follow up in: 2 days

## 2024-10-10 ENCOUNTER — CLINICAL SUPPORT (OUTPATIENT)
Dept: REHABILITATION | Facility: HOSPITAL | Age: 29
End: 2024-10-10
Payer: MEDICAID

## 2024-10-10 DIAGNOSIS — M25.531 PAIN IN BOTH WRISTS: Primary | ICD-10-CM

## 2024-10-10 DIAGNOSIS — M25.532 PAIN IN BOTH WRISTS: Primary | ICD-10-CM

## 2024-10-10 PROCEDURE — 97530 THERAPEUTIC ACTIVITIES: CPT

## 2024-10-11 NOTE — PROGRESS NOTES
Occupational Therapy  Occupational Therapy Outpatient Progress Note      Date: 10/10/2024  Name: Carlie Marcelino  Clinic Number: [id number]  : 1995  Visit Number: 7      Subjective    Carlie in good spirits without pain reports at rest and with activity.  She continues to report noting steady improvement in BUE strength/endurance when performing IADLs.  She reports performing her HEP daily as prescribed.    Patient's response to therapy: Carlie tolerated treatment well with good effort.         Objective     Current condition: Carlie presents with BUE weakness with occasional hand pain effecting IADL abilities.      TREATMENT    Principle of treatment/treatment today:  Pain management, BUE mobilization, BUE strengthening ex's, and review of HEP.      Modalities: Paraffin applied to bilateral hands/wrists 6 mins, moist heat applied to bilateral shoulders 10 mins.    Manual Therapy: While paraffin applied, performed bilateral forearm myofascial stretching with trigger point releasing.  Removed paraffin, performed bilateral wrist mobilization with end range stretching to tolerance. Removed moist heat, performed supine bilateral scapular mobilization, glenohumeral glides, and trigger point releasing to upper traps and scapular musculature.        Therapeutic Exercise:   Exercise Sets Reps Comments   Standing proximal strengthening with 1# wrist weights 3 10 Performed alternating wall slides (plus) then resisted alternating horizontal ad/ab with light resistance band   Bilateral hand/wrist/forearm strengthening ex with medium resistance flex bar 3 10 Performed supination followed by pronation.   Bilateral hand strengthening with gripper set at 25# resistance. 1 30 Performed gross grasp left then right hand     Education:  Reinforced HEP.    Out come measure using FOTO Form:  Evaluation:  FS score 69    Time In: 1320  Time Out: 1405  Total Appointment Time (timed & untimed codes): 45 minutes   Treatment time:  Therapeutic Activity: 45 minutes.      Assessment    Summary/Analysis of session:   Pt seen in clinic setting.  Pt on time and well groomed.  Continue to note improvement in pectoral flexibility with scapular retraction in standing.  Carlie continues to report increase in BUE strength and endurance when performing IADL activities.  Recommend continuing POC to address BUE deficits.       Progress toward previous goals: Continue STG/LTG      Goals:      LTG:  (Complete in 6 weeks)   Increase bilateral wrist AROM and  strength to be able to open a container without difficulty. (Progressing)  Increase score on the FOTO outcome measure to 73 or better.(Progressing)     STG: (Complete in 3 weeks)   Increase bilateral  strength 10# to be able to open a container with minimal difficulty. (Progressing)  Increase score on the FOTO outcome measure to 71 or greater. (Progressing)  pt will be educated on a HEP and perform independently. (Progressing)    Out come measure using FOTO Form:  Evaluation:  FS score 69      Plan    OT to focus next treatment session on pain management, increasing BUE AROM/strength, and review/modification of HEP.    Follow Up  Follow up in: 5 days

## 2024-10-14 ENCOUNTER — CLINICAL SUPPORT (OUTPATIENT)
Dept: REHABILITATION | Facility: HOSPITAL | Age: 29
End: 2024-10-14
Payer: MEDICAID

## 2024-10-14 DIAGNOSIS — M25.531 PAIN IN BOTH WRISTS: Primary | ICD-10-CM

## 2024-10-14 DIAGNOSIS — M25.532 PAIN IN BOTH WRISTS: Primary | ICD-10-CM

## 2024-10-14 PROCEDURE — 97530 THERAPEUTIC ACTIVITIES: CPT

## 2024-10-16 ENCOUNTER — CLINICAL SUPPORT (OUTPATIENT)
Dept: REHABILITATION | Facility: HOSPITAL | Age: 29
End: 2024-10-16
Payer: MEDICAID

## 2024-10-16 DIAGNOSIS — M25.532 PAIN IN BOTH WRISTS: Primary | ICD-10-CM

## 2024-10-16 DIAGNOSIS — M25.531 PAIN IN BOTH WRISTS: Primary | ICD-10-CM

## 2024-10-16 PROCEDURE — 97530 THERAPEUTIC ACTIVITIES: CPT

## 2024-10-16 NOTE — PROGRESS NOTES
Occupational Therapy  Occupational Therapy Outpatient Progress Note      Date: 10/14/2024  Name: Carlie Marcelino  Clinic Number: [id number]  : 1995  Visit Number: 8      Subjective    Carlie in good spirits without pain reports at rest and with activity.  She continues to report noting steady improvement in BUE strength/endurance when performing IADLs.  She reports performing her HEP daily as prescribed.    Patient's response to therapy: Carlie tolerated treatment well with good effort.         Objective     Current condition: Carlie presents with BUE weakness effecting IADL abilities.      TREATMENT    Principle of treatment/treatment today:  Pain management, BUE mobilization, BUE strengthening ex's, and review of HEP.      Modalities: Paraffin applied to bilateral hands/wrists 6 mins, moist heat applied to bilateral shoulders 10 mins.    Manual Therapy: While paraffin applied, performed bilateral forearm myofascial stretching with trigger point releasing.  Removed paraffin, performed bilateral wrist mobilization with end range stretching to tolerance. Removed moist heat, performed supine bilateral scapular mobilization, glenohumeral glides, and trigger point releasing to upper traps and scapular musculature.        Therapeutic Exercise:   Exercise Sets Reps Comments   Standing proximal strengthening with 1# wrist weights 3 10 Performed alternating wall slides (plus) then resisted alternating horizontal ad/ab with light resistance band   Bilateral hand/wrist/forearm strengthening ex with medium resistance flex bar 3 10 Performed supination followed by pronation.   Bilateral hand strengthening with gripper set at 25# resistance. 1 30 Performed gross grasp left then right hand     Education:  Reinforced HEP.    Out come measure using FOTO Form:  Evaluation:  FS score 69    Time In: 1330  Time Out: 1415  Total Appointment Time (timed & untimed codes): 45 minutes   Treatment time: Therapeutic Activity: 45  minutes.      Assessment    Summary/Analysis of session:   Pt seen in clinic setting.  Pt on time and well groomed.  Continue to note improvement in pectoral flexibility with scapular retraction in standing.  Carlie continues to report increase in BUE strength and endurance when performing IADL activities.  Recommend continuing POC to address BUE deficits.       Progress toward previous goals: Continue STG/LTG      Goals:      LTG:  (Complete in 6 weeks)   Increase bilateral wrist AROM and  strength to be able to open a container without difficulty. (Progressing)  Increase score on the FOTO outcome measure to 73 or better.(Progressing)     STG: (Complete in 3 weeks)   Increase bilateral  strength 10# to be able to open a container with minimal difficulty. (Progressing)  Increase score on the FOTO outcome measure to 71 or greater. (Progressing)  pt will be educated on a HEP and perform independently. (Progressing)    Out come measure using FOTO Form:  Evaluation:  FS score 69      Plan    OT to focus next treatment session on pain management, increasing BUE AROM/strength, and review/modification of HEP.    Follow Up  Follow up in: 2 days
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
## 2024-10-21 ENCOUNTER — CLINICAL SUPPORT (OUTPATIENT)
Dept: REHABILITATION | Facility: HOSPITAL | Age: 29
End: 2024-10-21
Payer: MEDICAID

## 2024-10-21 DIAGNOSIS — M25.532 PAIN IN BOTH WRISTS: Primary | ICD-10-CM

## 2024-10-21 DIAGNOSIS — M25.531 PAIN IN BOTH WRISTS: Primary | ICD-10-CM

## 2024-10-21 PROCEDURE — 97530 THERAPEUTIC ACTIVITIES: CPT

## 2024-10-22 NOTE — PROGRESS NOTES
"Occupational Therapy  Occupational Therapy Outpatient Progress Note      Date: 10/21/2024  Name: Carlie Marcelino  Clinic Number: [id number]  : 1995  Visit Number: 10      Subjective    Carlie in good spirits without pain reports at rest and with activity.  She continues to report noting steady improvement in BUE strength/endurance when performing IADLs, "I was able to cook yesterday without difficulty.".  She reports performing her HEP daily as prescribed.    Patient's response to therapy: Carlie tolerated treatment well with good effort.         Objective     Current condition: Carlie presents with BUE weakness effecting IADL abilities.      TREATMENT    Principle of treatment/treatment today:  Pain management, BUE mobilization, BUE strengthening ex's, and review of HEP.      Modalities: Paraffin applied to bilateral hands/wrists 6 mins.    Manual Therapy: While paraffin applied, performed bilateral forearm myofascial stretching with trigger point releasing.  Removed paraffin, performed bilateral wrist mobilization with end range stretching to tolerance.       Therapeutic Exercise:   Exercise Sets Reps Comments   BUE/BLE endurance training with Nustep 1 5 mins Level 1   Standing proximal strengthening with 1# wrist weights 3 10 Performed alternating wall slides (plus) then resisted alternating horizontal ad/ab with light resistance band   Bilateral wrist/forearm strengthening 3 10 Performed wrist extension over towel roll.   Bilateral hand/wrist/forearm strengthening ex with medium resistance flex bar 3 10 Performed supination followed by pronation.   BUE strength and endurance ex's with 4 then 5# dumbbells 1 150 feet carry Performed bilateral 150 ft suitcase carry  4# then 5# dumbbells with rest break.   Bilateral hand strengthening with gripper set at 25# resistance. 1 30 Performed gross grasp left then right hand     Education:  Reinforced HEP.    Out come measure using FOTO Form:  Evaluation:  FS " score 69  Reassessment: FS score 79    Time In: 1345  Time Out: 1425  Total Appointment Time (timed & untimed codes): 40 minutes   Treatment time: Therapeutic Activity: 40 minutes.      Assessment    Summary/Analysis of session:   Pt seen in clinic setting.  Pt on time and well groomed.  Began to progress BUE strength and endurance program.  Pt tolerated increase in activity.  Recommend continuing POC to address BUE deficits.       Progress toward previous goals: Continue STG/LTG      Goals:      LTG:  (Complete in 6 weeks)   Increase bilateral wrist AROM and  strength to be able to open a container without difficulty. (Partially Met)  Increase score on the FOTO outcome measure to 73 or better. (Goal Met)     STG: (Complete in 3 weeks)   Increase bilateral  strength 10# to be able to open a container with minimal difficulty. (Partially Met)  Increase score on the FOTO outcome measure to 71 or greater. (Goal Met)  pt will be educated on a HEP and perform independently. (Goal Met)      Plan    OT to focus next treatment session on pain management, increasing BUE AROM/strength, and review/modification of HEP.    Follow Up  Follow up in: 2 days

## 2024-10-23 ENCOUNTER — CLINICAL SUPPORT (OUTPATIENT)
Dept: REHABILITATION | Facility: HOSPITAL | Age: 29
End: 2024-10-23
Payer: MEDICAID

## 2024-10-23 DIAGNOSIS — M25.531 PAIN IN BOTH WRISTS: Primary | ICD-10-CM

## 2024-10-23 DIAGNOSIS — M25.532 PAIN IN BOTH WRISTS: Primary | ICD-10-CM

## 2024-10-23 PROCEDURE — 97530 THERAPEUTIC ACTIVITIES: CPT

## 2024-10-24 NOTE — PROGRESS NOTES
Occupational Therapy  Occupational Therapy Outpatient Progress Note      Date: 10/23/2024  Name: Carlie Marcelino  Clinic Number: [id number]  : 1995  Visit Number: 11      Subjective    Carlie in good spirits without pain reports at rest and with activity.  She continues to report noting steady improvement in BUE strength/endurance when performing IADLs.     Patient's response to therapy: Carlie tolerated treatment well with good effort.         Objective     Current condition: Carlie presents with BUE weakness effecting IADL abilities.      TREATMENT    Principle of treatment/treatment today:  Pain management, BUE mobilization, BUE strengthening/conditioning ex's, and review of HEP.      Modalities: Paraffin applied to bilateral hands/wrists 6 mins.    Manual Therapy: While paraffin applied, performed bilateral forearm myofascial stretching with trigger point releasing.  Removed paraffin, performed bilateral wrist mobilization with end range stretching to tolerance.       Therapeutic Exercise:   Exercise Sets Reps Comments   BUE/BLE endurance training with Nustep 1 10 mins Level 2   BUE/core strengthening ex's with  with 1# wrist weights 1 10 While on low mat in tall kneeling, performed bilateral ball raises midline then PNF D1 diagonals l/r   BUE/core strengthening in quadriped position 1 10 Performed alternating contralateral raises    Bilateral wrist/forearm strengthening 3 10 Performed wrist extension over towel roll.   Bilateral hand/wrist/forearm strengthening ex with medium resistance flex bar 3 10 Performed supination followed by pronation.   Bilateral hand strengthening with gripper set at 25# resistance. 1 30 Performed gross grasp left then right hand     Education:  Reinforced HEP.    Out come measure using FOTO Form:  Evaluation:  FS score 69  Reassessment: FS score 79    Time In: 1330  Time Out: 1410  Total Appointment Time (timed & untimed codes): 40 minutes   Treatment time: Therapeutic  Activity: 40 minutes.      Assessment    Summary/Analysis of session:   Pt seen in clinic setting.  Pt on time and well groomed.  Began to progress BUE/core strength and endurance program.  Pt tolerated increase in activity.  Discussed discharge planning for next session.    Progress toward previous goals: Continue STG/LTG      Goals:      LTG:  (Complete in 6 weeks)   Increase bilateral wrist AROM and  strength to be able to open a container without difficulty. (Partially Met)  Increase score on the FOTO outcome measure to 73 or better. (Goal Met)     STG: (Complete in 3 weeks)   Increase bilateral  strength 10# to be able to open a container with minimal difficulty. (Partially Met)  Increase score on the FOTO outcome measure to 71 or greater. (Goal Met)  pt will be educated on a HEP and perform independently. (Goal Met)      Plan    OT to focus next treatment session on pain management, increasing BUE/core AROM/strength, and complete discharge planning    Follow Up  Follow up in: 5 days

## 2024-10-28 ENCOUNTER — CLINICAL SUPPORT (OUTPATIENT)
Dept: REHABILITATION | Facility: HOSPITAL | Age: 29
End: 2024-10-28
Payer: MEDICAID

## 2024-10-28 DIAGNOSIS — M25.531 PAIN IN BOTH WRISTS: Primary | ICD-10-CM

## 2024-10-28 DIAGNOSIS — M25.532 PAIN IN BOTH WRISTS: Primary | ICD-10-CM

## 2024-10-28 PROCEDURE — 97530 THERAPEUTIC ACTIVITIES: CPT

## 2025-01-23 RX ORDER — METOPROLOL TARTRATE 25 MG/1
25 TABLET, FILM COATED ORAL 2 TIMES DAILY
Qty: 60 TABLET | Refills: 3 | Status: SHIPPED | OUTPATIENT
Start: 2025-01-23

## 2025-03-24 ENCOUNTER — PATIENT MESSAGE (OUTPATIENT)
Dept: PEDIATRIC CARDIOLOGY | Facility: CLINIC | Age: 30
End: 2025-03-24
Payer: MEDICAID

## 2025-03-24 ENCOUNTER — HOSPITAL ENCOUNTER (OUTPATIENT)
Dept: PEDIATRIC CARDIOLOGY | Facility: HOSPITAL | Age: 30
Discharge: HOME | End: 2025-03-24
Attending: PEDIATRICS
Payer: MEDICAID

## 2025-03-24 ENCOUNTER — TELEPHONE (OUTPATIENT)
Dept: PEDIATRIC CARDIOLOGY | Facility: CLINIC | Age: 30
End: 2025-03-24
Payer: MEDICAID

## 2025-03-24 DIAGNOSIS — Q24.9 ADULT CONGENITAL HEART DISEASE: ICD-10-CM

## 2025-03-24 DIAGNOSIS — Z87.74 H/O AORTIC COARCTATION REPAIR: ICD-10-CM

## 2025-03-24 DIAGNOSIS — I50.22 CHRONIC SYSTOLIC HEART FAILURE: ICD-10-CM

## 2025-03-24 DIAGNOSIS — Z95.2 H/O MITRAL VALVE REPLACEMENT WITH MECHANICAL VALVE: ICD-10-CM

## 2025-03-24 DIAGNOSIS — Q24.4 SUBAORTIC STENOSIS: ICD-10-CM

## 2025-03-24 DIAGNOSIS — Q24.9 ADULT CONGENITAL HEART DISEASE: Primary | ICD-10-CM

## 2025-03-24 NOTE — TELEPHONE ENCOUNTER
----- Message from ANA MARIA Barker sent at 3/24/2025  9:41 AM CDT -----  Contact: [phone]  She went to the ER over the weekend, they diagnosed her with SVT and Hypokalemia. You can take a look at her records in care everywhere. Her yearly is due in May, I was able to schedule her Echo for April 9th and a visit with you in [place] on April 21. Just let me know how you want to proceed regarding the ER visit. Lucero RAMEY RN  ----- Message -----  From: Adeline Casas  Sent: 3/24/2025   9:26 AM CDT  To: Sharif JIMENEZ Staff    Would like to receive medical advice.Would they like a call back or a response via [facility]:  call backAdditional information:  Carlie is calling to speak to the provider on behalf of what happened over the weekend with her. Pt did not go into detail just stated she would like to speak to her provider. Please call Carlie back for advice

## 2025-03-24 NOTE — TELEPHONE ENCOUNTER
Patient had an episode of tachycardia a few days ago.  She felt fine that morning.  She ate around 2:00 p.m..  She went to a birthday party for a young child at [employer] for about 5 hours.  She did not eat at that party, but she did drink plenty of water.  They then went to [employer] to get dinner.  She got her nephew out of the car, and she immediately felt very weak and dizzy.  She sat on the curve.  There happens to be some paramedics at the restaurant, and they checked her heart rate and it was about 176 beats per minute.  They took her to the emergency room.  On the way, they gave her adenosine.  She definitely felt the adenosine it felt like I was going to die.  Then she felt like her heart rate decreased although was still elevated at around 110.  In the emergency room, she reports that her heart rate was occasionally jumping up to about 140.  She was given fluids, potassium due to hypokalemia, and her evening metoprolol dose.  She has done well since that time.      We will get her a 7 day Holter and plan for her to see me along with electrophysiology when we come to clinic next month.  She has an echocardiogram scheduled in the near future.  If she has recurrent tachycardia, she is going to go back to the emergency room.  She will continue her metoprolol.

## 2025-03-27 ENCOUNTER — PATIENT MESSAGE (OUTPATIENT)
Facility: CLINIC | Age: 30
End: 2025-03-27
Payer: MEDICAID

## 2025-03-28 NOTE — PROGRESS NOTES
Name: Carlie Marcelino  MRN: [id number]  : 1995    The patient location is: [place], [place].    Visit type: audiovisual    Face to Face time with patient: 8:32am-8:58am (24 mins)  45 minutes of total time spent on the encounter, which includes face to face time and non-face to face time preparing to see the patient (eg, review of tests), Obtaining and/or reviewing separately obtained history, Documenting clinical information in the electronic or other health record, Independently interpreting results (not separately reported) and communicating results to the patient/family/caregiver, or Care coordination (not separately reported).     Each patient to whom he or she provides medical services by telemedicine is:  (1) informed of the relationship between the physician and patient and the respective role of any other health care provider with respect to management of the patient; and (2) notified that he or she may decline to receive medical services by telemedicine and may withdraw from such care at any time.      Subjective:   CC: ACHD, SVT    HPI:    Carlie Marcelino is a 29 y.o. female with hx of Coarctation of the Aorta (CoA) s/p repair, Sub-aortic stenosis s/p modified Konno, Ventricular septal defect (VSD) s/p surgical closure, severe mitral regurgitation s/p mitral ring and later mechanical mitral valve replacement; who presents to [facility] at [facility] to discuss recent finding of atrial flutter.   On 2025,  she got her nephew out of the car, and she immediately felt very weak and dizzy, and sat on the curve.  There happens to be some paramedics at the restaurant, and they checked her heart rate and it was about 176 beats per minute.  They took her to the emergency room.  On the way, they gave her adenosine.  She definitely felt the adenosine it felt like I was going to die.  Then she felt like her heart rate decreased although was still elevated at  around 110.  In the emergency room, she reports that her heart rate was occasionally jumping up to about 140.  She was given fluids, potassium due to hypokalemia, and her evening metoprolol dose.  She has done well since that time.    She had been very fatigued and with significant activity intolerance. She had a left ear infection and UTI during this time. HR would jump around, 130s to 80s. She returned to the emergency room, received adenosine, but this time it didn't help. She states that the physician noted that they first thought it was SVT, but was in fact atrial flutter. They gave her some potassium and Magnesium (since they had been low). She was initiated on an iv beta-blocker. She was admitted, and ultimately required DCCV.      Past-Medical Hx/Problem List:  Coarctation of the aorta   S/P repair November 2000  S/P most recent catheterization 2022  no significant obstruction noted across coarctation repair  Severe mitral valve regurgitation   S/P mitral valve replacement with a 23 mm [employer] [place] mechanical valve June 2010  trivial stenosis of the mechanical valve with 4 mm of mercury gradient noted on 2022 catheterization  Currently enrolled in Coumadin Clinic at [facility] in [place]  Severe subaortic stenosis treated with a modified Konno procedure May 2004 followed by VSD closure September 2004  S/P resection of a supravalvar mitral ring and Primum atrial septal defect closed January 2001.   no residual obstruction within the left atrium, no evidence of residual intracardiac shunting  no left ventricular outflow obstruction and no significant aortic valve insufficiency  Mild to moderate tricuspid insufficiency   Intra-atrial reentrant tachycardia (AVNRT)/atrial flutter  S/P DCCV 3/2025.  No pulmonary hypertension or pulmonary vascular resistance elevation on catheterization 2022  Reduced left ventricular systolic function primarily due to VSD patch  left ventricular ejection  fraction around 45-55%    (dyskinetic basal septum from VSD patch plays a role - recent EF was in the 50-55% range depending on view)  History of wide complex tachycardia/SVT 2020  Distant history of seizures, last seen by Neurology at 10 years of age.    Pectus carinatum.  All surgeries performed at [facility].      Family Hx:  Family History   Problem Relation Name Age of Onset    No Known Problems Mother      Heart disease Father Marcell GARCÍA Pharris Jr     Hypertension Father Marcell GARCÍA Pharris Jr     Diabetes Father Marcell GARCÍA Pharris Jr     Miscarriages / Stillbirths Sister Odilia Marcelino     No Known Problems Brother      No Known Problems Maternal Aunt      No Known Problems Maternal Uncle      No Known Problems Paternal Aunt      No Known Problems Paternal Uncle      No Known Problems Maternal Grandmother      Stroke Maternal Grandfather Bib Grier     No Known Problems Paternal Grandmother      No Known Problems Paternal Grandfather      No Known Problems Other      Anemia Neg Hx      Arrhythmia Neg Hx      Asthma Neg Hx      Clotting disorder Neg Hx      Fainting Neg Hx      Heart attack Neg Hx      Heart failure Neg Hx      Hyperlipidemia Neg Hx      Atrial Septal Defect Neg Hx         Social Hx:  Lives in [place], [place].    Review of Systems: (since cardioversion)  GEN:  No fevers, No fatigue, No weight-loss, No weight-gain  EYE:  No significant changes in vision  ENT: No cough, No congestion, No swelling, No snoring, No hearing loss,   RESP: No increased work of breathing, No dyspnea, No noisy breathing  CV:  No chest pain, No palpitations, No tachycardia, No activity or exercise intolerance  GI:  No abdominal pain, No nausea, No vomiting, No diarrhea, No constipation  GOMEZ: Normal UOP  MSK: No pain, No swelling, No joint dislocations, No scoliosis, No extremity swelling  HEME: On coumadin.  NEUR: + history of seizures (only in early childhood), No dizziness, No near-syncope, No syncope  DERM: No  Rashes  ALL: See below.    Medications & Allergy:  Medications Ordered Prior to Encounter[1]    Review of patient's allergies indicates:  No Known Allergies       Objective:   Vitals:  There were no vitals filed for this visit.  There is no height or weight on file to calculate BMI.  There is no height or weight on file to calculate BSA.    Exam:  GEN: No acute distress, Normal appearing  EYE: Anicteric sclerae  ENT: No drainage, Moist mucous membranes  PULM: Normal work of breathing;  CV: No cyanosis   EXT: No apparent edema  ABD: Non-distended  DERM: No visible rashes  NEUR: Grossly normal and age-appropriate movements.  PSY: Normal mood and affect      Results / Data:   ECG:   (03/22/2025) - Sinus tachcyardia  (03/22/2025) - SVT with paroxysmal termination.    (05/20/2024) - Sinus rhythm, RBBB, PAC.  (11/13/2023) - Sinus rhythm, RBBB, PAC.    Holter/Zio:   (03/24/2025)  Pending.    (05/16/2023)  Sinus rhythm predominates.  6 episodes of non-sustained SVT  Longest 11-beats duration  Fastest avg 151bpm  Not associated with a patient-triggered event.  Normal HR range.  No patient-triggered events.  1 atrial triplet, otherwise no significant ectopy burden.  Frequent isolated PVCs (11.0%) with one ventriclar triplet.      Echocardiogram:   (05/20/2024)  Shone's complex. Coarctation s/p repair, supravalvar mitral ring resection and primum atrial septal defect closure. Yonis for subaortic stenosis with ventricular septal defect closure. S/p 23mm [employer]. Keith mechanical mitral valve replacement.   No significant change from last echocardiogram.   The study was technically limited with all images being suboptimal in quality.   Mild left atrial enlargement   Mild, medially located tricuspid insufficiency   The aortic valve morphology was not visualized. There is flow acceleration thet starts proximal to the aortic valve. The LVOT/aortic valve velocity is at the upper limits of normal with a peak velocity of 1.9 m/sec and no  obvious aortic insufficiency   The mechanical mitral valve is well seated with a mean pressure gradietn of ranging from 10-12 mmHg and no significant regurgitation.  The ventricular septum is echobright. Qualitatively the left ventricle is mildly dilated with basal septal hypokinesis, good posterior wall motion and overall low normal systolic function with EF around 55%. Qualitatively normal right ventricular size and systolic function.   Tricuspid insufficiency estimates a RV systolic pressure 28mmHg greater than the RA pressure.   Previously noted left aortic arch. Well repaired coarctation with very mild flow acceleration (2.1 m/s) across the repair without diastolic run off.   There is flow acceleration thet starts proximal to the aortic valve. The LVOT/aortic valve velocity is 1.9 m/s.    MRI:  (01/27/2022)  Normal RV volumes with RVEF of 48%  Severe left atrial enlargement.   Well-seated mechanical mitral valve.   The left ventricle is dilated with increased volumes. There is a large VSD patch and paradoxical septal wall motion. Normal LV free wall motion. The calculated LVEF is 43%.   Bicuspid aortic valve with small annulus size. Regurgitant fraction 2%, regurgitant volume 1 cc. Normal velocity of 1.6m/sec. In cine images, there is slight flow aliasing in the subaortic area.   Low normal/mildly hypoplastic aorta (root, ascending aorta, and arch) with no discrete stenosis (measurements as noted above).   Left aortic arch with unusual branching. 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     Assessment / Plan:   Carlie Marcelino is a 29 y.o. 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    She remains on anticoagulation, specifically for her history of mechanical mitral valve replacement, but this is also reasonable to be long given her recent diagnosis of intermittent atrial flutter.  She is also on a beta blocker.  Ultimately, I would recommend an EP study with ablation.  We discussed this procedure at length, and she does want to move forward with that.      Follow-up:   Elective EP study/ablation with myself and Dr. Cancino.  Cardiac medications:    Coumadin  Metoprolol  Aspirin (as per Dr. Olguin)  Activity restrictions:    None  SBE prophylaxis:    Yes.  Antibiotic prophylaxis is required prior to all dental procedures cleanings.    Please contact us if he has any questions or concerns.  Our clinic from his [phone] during office hours. For urgent night and weekend concerns, call [phone] and ask for the pediatric cardiologist on call to be paged.         [1]   Current Outpatient Medications on File Prior to Visit   Medication Sig Dispense Refill    SAGAR ASPIRIN ORAL       JANTOVEN 6 mg tablet TAKE ONE (1) TABLET BY MOUTH ONCE DAILY AND ADD ONE HALF (1/2) TABLET ON MONDAYS AND FRIDAYS FOR A TOTAL OF ONE AND A HALF (1 & 1/2) TABS (9MGS) ON MON & FRI (Patient taking differently: Take 6 mg by mouth Daily. Tues takes 9 mg) 34 tablet 6    metoprolol tartrate (LOPRESSOR) 25 MG tablet TAKE ONE TABLET BY MOUTH TWICE A DAY 60 tablet 3     No current facility-administered medications on file prior to visit.

## 2025-04-01 ENCOUNTER — TELEPHONE (OUTPATIENT)
Dept: PEDIATRIC CARDIOLOGY | Facility: CLINIC | Age: 30
End: 2025-04-01
Payer: MEDICAID

## 2025-04-01 NOTE — TELEPHONE ENCOUNTER
----- Message from Luis sent at 4/1/2025  4:50 PM CDT -----  Contact: pt @  [phone]  Name of Who is Calling: pt  What is the request in detail:pt is calling to inform the provider that she had to stay over night at the ER pt says that she dose not have her heart monitor on and that she need to know what to do next  Can the clinic reply by [facility]: no  What Number to Call Back if not in [facility]:  [phone]

## 2025-04-01 NOTE — TELEPHONE ENCOUNTER
Returned patient call. She relays a recent ER visit and admit for A Flutter/Fib. She states holter was removed for cardioversion and was only worn for 3 days. Advised patient to return holter. Patient asked is she should continue to take antibiotics given by urgent care for ear infection that she did not take while in the hospital as she was given a different antibiotic. Advised she reach out to PCP to inquire if she should be reevaluated or continue antibiotic course. Patient voiced understanding.

## 2025-04-02 ENCOUNTER — OFFICE VISIT (OUTPATIENT)
Dept: FAMILY MEDICINE | Facility: CLINIC | Age: 30
End: 2025-04-02
Payer: MEDICAID

## 2025-04-02 VITALS
WEIGHT: 104.38 LBS | DIASTOLIC BLOOD PRESSURE: 77 MMHG | HEIGHT: 58 IN | SYSTOLIC BLOOD PRESSURE: 117 MMHG | OXYGEN SATURATION: 98 % | TEMPERATURE: 98 F | RESPIRATION RATE: 18 BRPM | BODY MASS INDEX: 21.91 KG/M2 | HEART RATE: 64 BPM

## 2025-04-02 DIAGNOSIS — H92.02 LEFT EAR PAIN: Primary | ICD-10-CM

## 2025-04-02 DIAGNOSIS — Z95.2 H/O MITRAL VALVE REPLACEMENT WITH MECHANICAL VALVE: ICD-10-CM

## 2025-04-02 DIAGNOSIS — I48.20 CHRONIC ATRIAL FIBRILLATION: ICD-10-CM

## 2025-04-02 PROBLEM — Q21.0 VSD (VENTRICULAR SEPTAL DEFECT): Status: ACTIVE | Noted: 2025-04-02

## 2025-04-02 RX ORDER — AMOXICILLIN AND CLAVULANATE POTASSIUM 875; 125 MG/1; MG/1
1 TABLET, FILM COATED ORAL 2 TIMES DAILY
COMMUNITY
Start: 2025-03-29 | End: 2025-04-02

## 2025-04-02 NOTE — PROGRESS NOTES
Patient ID: [id number]     Chief Complaint: Establish Care and Otalgia (Started Saturday morning. )    HPI:     Carlie Marcelino is a 29 y.o. female here today for Establish Care and Otalgia (Started Saturday morning. ). Recent ER visit for A-fib/flutter requiring cardioversion. Mechanical heart valve, on Warfarin.     History of Present Illness               -------------------------------------    Aortic stenosis    Coarctation of aorta    Mitral valve stenosis        Past Surgical History:   Procedure Laterality Date    CARDIAC SURGERY      CARDIAC VALVE REPLACEMENT  7/2010    COMBINED RIGHT AND RETROGRADE LEFT HEART CATHETERIZATION FOR CONGENITAL HEART DEFECT N/A 03/17/2022    Procedure: CATHETERIZATION, HEART, COMBINED RIGHT AND RETROGRADE LEFT, FOR CONGENITAL HEART DEFECT;  Surgeon: Sindhu Bellamy MD;  Location: [facility] CATH LAB;  Service: Cardiology;  Laterality: N/A;  Pedi Heart    MITRAL VALVE REPLACEMENT      REPAIR OF COARCTATION OF AORTA      TRANSTHORACIC ECHOCARDIOGRAPHY (TTE)  03/17/2022    Procedure: ECHOCARDIOGRAM, TRANSTHORACIC;  Surgeon: Sindhu Bellamy MD;  Location: [facility] CATH LAB;  Service: Cardiology;;       Review of patient's allergies indicates:  No Known Allergies    Outpatient Medications Marked as Taking for the 4/2/25 encounter (Office Visit) with Wayne Chang, DO   Medication Sig Dispense Refill    SAGAR ASPIRIN ORAL       JANTOVEN 6 mg tablet TAKE ONE (1) TABLET BY MOUTH ONCE DAILY AND ADD ONE HALF (1/2) TABLET ON MONDAYS AND FRIDAYS FOR A TOTAL OF ONE AND A HALF (1 & 1/2) TABS (9MGS) ON MON & FRI (Patient taking differently: Take 6 mg by mouth Daily. Tues takes 9 mg) 34 tablet 6    metoprolol tartrate (LOPRESSOR) 25 MG tablet TAKE ONE TABLET BY MOUTH TWICE A DAY 60 tablet 3       Social History[1]     Family History   Problem Relation Name Age of Onset    No Known Problems Mother      Heart disease Father Marcell Marcelino Jr     Hypertension Father Marcell Marcelino  "Jr     Diabetes Father Marcell Marcelino Jr     Miscarriages / Stillbirths Sister Odilia Marcelino     No Known Problems Brother      No Known Problems Maternal Aunt      No Known Problems Maternal Uncle      No Known Problems Paternal Aunt      No Known Problems Paternal Uncle      No Known Problems Maternal Grandmother      Stroke Maternal Grandfather Bib Grier     No Known Problems Paternal Grandmother      No Known Problems Paternal Grandfather      No Known Problems Other      Anemia Neg Hx      Arrhythmia Neg Hx      Asthma Neg Hx      Clotting disorder Neg Hx      Fainting Neg Hx      Heart attack Neg Hx      Heart failure Neg Hx      Hyperlipidemia Neg Hx      Atrial Septal Defect Neg Hx          Patient Care Team:  Wayne Chang DO as PCP - General (Family Medicine)  Aguilar Olguin MD as Consulting Physician (Pediatric Cardiology)  Andrez Peña MD as Consulting Physician (Pediatric Cardiology)     Subjective:     ROS    See HPI for details  All Other ROS: Negative except as stated in HPI.       Objective:     /77 (BP Location: Left arm, Patient Position: Sitting)   Pulse 64   Temp 97.9 °F (36.6 °C)   Resp 18   Ht 4' 10" (1.473 m)   Wt 47.4 kg (104 lb 6.4 oz)   LMP 03/17/2025 (Approximate)   SpO2 98%   BMI 21.82 kg/m²     Physical Exam  Vitals reviewed.   Constitutional:       General: She is not in acute distress.     Appearance: Normal appearance.   HENT:      Right Ear: Tympanic membrane, ear canal and external ear normal. There is no impacted cerumen.      Ears:      Comments: Mild erythema of TM of left ear.   Cardiovascular:      Rate and Rhythm: Normal rate and regular rhythm.      Heart sounds: No murmur heard.     No friction rub. No gallop.   Pulmonary:      Effort: No respiratory distress.      Breath sounds: No wheezing, rhonchi or rales.   Musculoskeletal:         General: No swelling, tenderness or deformity.      Right lower leg: No edema.      Left lower leg: No edema. "   Skin:     General: Skin is warm and dry.      Findings: No lesion or rash.   Neurological:      General: No focal deficit present.      Mental Status: She is alert.   Psychiatric:         Mood and Affect: Mood normal.         Assessment/Plan:     1. Left ear pain  -antibiotics stopped due to concern that it was possibly triggering her A-fib/flutter as well as potentially interacting with her warfarin.   -left ear infection is resolving. Will hold off on restarting antibiotics at this time. Patient voiced understanding.   2. Chronic atrial fibrillation  Currently on metoprolol tartrate 25mg BID and warfarin.   3. H/O mitral valve replacement with mechanical valve  On warfarin.         Assessment & Plan               This note was generated with the assistance of ambient listening technology. Verbal consent was obtained by the patient and accompanying visitor(s) for the recording of patient appointment to facilitate this note. I attest to having reviewed and edited the generated note for accuracy, though some syntax or spelling errors may persist. Please contact the author of this note for any clarification.     Follow up:     Follow up in about 3 months (around 7/2/2025) for Wellness. In addition to their scheduled follow up, the patient has also been instructed to follow up on as needed basis.            [1]   Social History  Socioeconomic History    Marital status: Single   Tobacco Use    Smoking status: Never     Passive exposure: Never    Smokeless tobacco: Never   Substance and Sexual Activity    Alcohol use: Never    Drug use: Never    Sexual activity: Not Currently     Partners: Male     Birth control/protection: Condom     Social Drivers of Health     Financial Resource Strain: Low Risk  (4/1/2025)    Overall Financial Resource Strain (CARDIA)     Difficulty of Paying Living Expenses: Not hard at all   Food Insecurity: No Food Insecurity (4/1/2025)    Hunger Vital Sign     Worried About Running Out of Food  in the Last Year: Never true     Ran Out of Food in the Last Year: Never true   Transportation Needs: No Transportation Needs (4/1/2025)    PRAPARE - Transportation     Lack of Transportation (Medical): No     Lack of Transportation (Non-Medical): No   Physical Activity: Insufficiently Active (4/1/2025)    Exercise Vital Sign     Days of Exercise per Week: 3 days     Minutes of Exercise per Session: 30 min   Stress: No Stress Concern Present (4/1/2025)    [employer] - Occupational Stress Questionnaire     Feeling of Stress : Not at all   Housing Stability: Low Risk  (4/1/2025)    Housing Stability Vital Sign     Unable to Pay for Housing in the Last Year: No     Number of Times Moved in the Last Year: 0     Homeless in the Last Year: No

## 2025-04-03 ENCOUNTER — OFFICE VISIT (OUTPATIENT)
Dept: CARDIOLOGY | Facility: CLINIC | Age: 30
End: 2025-04-03
Payer: MEDICAID

## 2025-04-03 DIAGNOSIS — Q24.9 ADULT CONGENITAL HEART DISEASE: ICD-10-CM

## 2025-04-03 DIAGNOSIS — I48.4 ATYPICAL ATRIAL FLUTTER: Primary | ICD-10-CM

## 2025-04-03 DIAGNOSIS — Q24.4 SUBAORTIC STENOSIS: ICD-10-CM

## 2025-04-03 DIAGNOSIS — Z95.2 H/O MITRAL VALVE REPLACEMENT WITH MECHANICAL VALVE: ICD-10-CM

## 2025-04-03 DIAGNOSIS — Q21.0 VSD (VENTRICULAR SEPTAL DEFECT): ICD-10-CM

## 2025-04-09 ENCOUNTER — CLINICAL SUPPORT (OUTPATIENT)
Dept: PEDIATRIC CARDIOLOGY | Facility: CLINIC | Age: 30
End: 2025-04-09
Attending: PEDIATRICS
Payer: MEDICAID

## 2025-04-09 DIAGNOSIS — I50.22 CHRONIC SYSTOLIC HEART FAILURE: ICD-10-CM

## 2025-04-09 DIAGNOSIS — Z87.74 H/O AORTIC COARCTATION REPAIR: ICD-10-CM

## 2025-04-09 DIAGNOSIS — Q24.4 SUBAORTIC STENOSIS: ICD-10-CM

## 2025-04-09 DIAGNOSIS — Q24.9 ADULT CONGENITAL HEART DISEASE: ICD-10-CM

## 2025-04-09 DIAGNOSIS — Z95.2 H/O MITRAL VALVE REPLACEMENT WITH MECHANICAL VALVE: ICD-10-CM

## 2025-04-10 ENCOUNTER — TELEPHONE (OUTPATIENT)
Dept: PEDIATRIC CARDIOLOGY | Facility: CLINIC | Age: 30
End: 2025-04-10
Payer: MEDICAID

## 2025-04-10 NOTE — TELEPHONE ENCOUNTER
Returned call and spoke with representative.  3/29 patient have a flutter 204 bmp for 60 sec and 3/30 had a 6.7 second pause.  Report available.  Dr. Olguin notified.    ----- Message from Yola sent at 4/10/2025 10:37 AM CDT -----  Contact: Kelsey [phone]  Kelsey from [employer] calling to report a abnormal heart monitor results.  Please call back . Ref # [id number]

## 2025-04-19 DIAGNOSIS — I48.4 ATYPICAL ATRIAL FLUTTER: Primary | ICD-10-CM

## 2025-04-19 NOTE — PROGRESS NOTES
2025     re:Carlie Marcelino  :1995    BernardoWayne, DO  [address] [facility] [address]    Dr. Albert Guitierrez [facility]     Dear Dr. Goodson and Ms. Sabine:    Carlie Marcelino is a 29 y.o. female seen in my A[facility]D clinic today for evaluation of congenital heart disease.  To summarize her diagnoses are as follow:  1. Coarctation of the aorta status post repair 2000  - most recent catheterization   - no significant obstruction noted across coarctation repair  2. Status post resection of a supravalvar mitral ring and Primum atrial septal defect closed 2001  - no residual obstruction within the left atrium, no evidence of residual intracardiac shunting  3. Severe subaortic stenosis treated with a modified Konno procedure May 2004 followed by VSD closure 2004  - no left ventricular outflow obstruction and no significant aortic valve insufficiency  4. Severe mitral valve regurgitation necessitating mitral valve replacement with a 23 mm [place] mechanical valve 2010  - trivial stenosis of the mechanical valve with 4 mm of mercury gradient noted on  catheterization  - mild to moderate tricuspid insufficiency   - no pulmonary hypertension or pulmonary vascular resistance elevation on catheterization   - currently enrolled in Coumadin Clinic at [facility] in [place]  5. Reduced left ventricular systolic function primarily due to VSD patch noted in the past  - EF today was in the 50-55% range  5. History of wide complex tachycardia/SVT   6. Distant history of seizures, last seen by Neurology at 10 years of age.    7. New diagnosis of atrial fibrillation/flutter with rapid ventricular response requiring cardioversion 2025  [facility].      To summarize, my recommendations are as follows:  1. Continue Coumadin with goal INR 2.5-3.5.   Continue aspirin.  2. Continue metoprolol  3. SBE prophylaxis is definitely indicated.  4. Scheduling EPS/ablation with Faye Peña and Barak  5. Follow up after EP procedure, repeat echo and EKG in 1 year as well    Discussion:  1. Her left ventricular ejection fraction is on the low end of normal, but this is primarily due to dyskinetic motion of the septum and her VSD patch.  Her posterior and free walls move very well.  She certainly does not need a heart transplant, especially given her very reassuring catheterization numbers.  Given the lack of symptoms, I am going to hold off on adding any additional afterload reduction although I would be quick to consider adding Entresto if there were any concerning symptoms.  2. The catheterization done in 2022 was very reassuring with a mild mitral valve gradient and no pulmonary hypertension.    3. Her left ventricular outflow tract, aortic valve, and coarctation repair looked great.  4. We discussed pregnancy in the past.  I agree that she is a high risk pregnancy given her mechanical valve and high-dose Coumadin along with her mildly reduced function.  5. I completely agree with plans for ablation of her atrial flutter.      History of present illness:   Back on March 24, 2025, I spoke to the patient on the phone:   Patient had an episode of tachycardia a few days ago.  She felt fine that morning.  She ate around 2:00 p.m..  She went to a birthday party for a young child at [employer] for about 5 hours.  She did not eat at that party, but she did drink plenty of water.  They then went to [employer] to get dinner.  She got her nephew out of the car, and she immediately felt very weak and dizzy.  She sat on the curve.  There happens to be some paramedics at the restaurant, and they checked her heart rate and it was about 176 beats per minute.  They took her to the emergency room.  On the way, they gave her adenosine.  She definitely felt the adenosine it felt  like I was going to die.  Then she felt like her heart rate decreased although was still elevated at around 110.  In the emergency room, she reports that her heart rate was occasionally jumping up to about 140.  She was given fluids, potassium due to hypokalemia, and her evening metoprolol dose.    She then presented to the emergency room March 30, 2025 with palpitations, an EKG revealed atrial fibrillation with rapid ventricular response.  She was admitted on telemetry to Cardiology Service and [facility]       She saw EP on 4/3/25 - they are planning an EP study and ablation.    Overall, she has done well since that visit.  She has occasional episodes where she checks her heart rate notices it is up around 130, but she is asymptomatic.  No episodes with heart rates above 140.  No shortness of breath.  No other new issues.    Of note, she wore a Holter monitor at the time of this episode that showed:    - The predominant rhythm was sinus rhythm with a prolonged episode of atrial flutter. Atrial flutter rates varied considerably, from 270 bpm briefly to a 6 second pause. According to documentation, the Zio was removed for cardioversion for treatment of flutter.     The review of systems is as noted above. It is otherwise negative for other symptoms related to the general, neurological, psychiatric, endocrine, gastrointestinal, genitourinary, respiratory, dermatologic, musculoskeletal, hematologic, and immunologic systems.    Past Medical History:   Diagnosis Date    Aortic stenosis     Coarctation of aorta     Mitral valve stenosis      Past Surgical History:   Procedure Laterality Date    CARDIAC SURGERY      CARDIAC VALVE REPLACEMENT  7/2010    COMBINED RIGHT AND RETROGRADE LEFT HEART CATHETERIZATION FOR CONGENITAL HEART DEFECT N/A 03/17/2022    Procedure: CATHETERIZATION, HEART, COMBINED RIGHT AND RETROGRADE LEFT, FOR CONGENITAL HEART DEFECT;  Surgeon: Sindhu PEÑA.  MD Josesito;  Location: [facility] CATH LAB;  Service: Cardiology;  Laterality: N/A;  Pedi Heart    MITRAL VALVE REPLACEMENT      REPAIR OF COARCTATION OF AORTA      TRANSTHORACIC ECHOCARDIOGRAPHY (TTE)  03/17/2022    Procedure: ECHOCARDIOGRAM, TRANSTHORACIC;  Surgeon: Sindhu Bellamy MD;  Location: [facility] CATH LAB;  Service: Cardiology;;     Family History   Problem Relation Name Age of Onset    No Known Problems Mother      Heart disease Father Marcell GARCÍA Pharris Jr     Hypertension Father Marcell GARCÍA Pharris Jr     Diabetes Father Marcell GARCÍA Pharris Jr     Miscarriages / Stillbirths Sister Odilia Marcelino     No Known Problems Brother      No Known Problems Maternal Aunt      No Known Problems Maternal Uncle      No Known Problems Paternal Aunt      No Known Problems Paternal Uncle      No Known Problems Maternal Grandmother      Stroke Maternal Grandfather Bib Grier     No Known Problems Paternal Grandmother      No Known Problems Paternal Grandfather      No Known Problems Other      Anemia Neg Hx      Arrhythmia Neg Hx      Asthma Neg Hx      Clotting disorder Neg Hx      Fainting Neg Hx      Heart attack Neg Hx      Heart failure Neg Hx      Hyperlipidemia Neg Hx      Atrial Septal Defect Neg Hx       Social History     Socioeconomic History    Marital status: Single   Tobacco Use    Smoking status: Never     Passive exposure: Never    Smokeless tobacco: Never   Substance and Sexual Activity    Alcohol use: Never    Drug use: Never    Sexual activity: Not Currently     Partners: Male     Birth control/protection: Condom     Social Drivers of Health     Financial Resource Strain: Low Risk  (4/1/2025)    Overall Financial Resource Strain (CARDIA)     Difficulty of Paying Living Expenses: Not hard at all   Food Insecurity: No Food Insecurity (4/1/2025)    Hunger Vital Sign     Worried About Running Out of Food in the Last Year: Never true     Ran Out of Food in the Last Year: Never true   Transportation Needs: No  "Transportation Needs (4/1/2025)    PRAPARE - Transportation     Lack of Transportation (Medical): No     Lack of Transportation (Non-Medical): No   Physical Activity: Insufficiently Active (4/1/2025)    Exercise Vital Sign     Days of Exercise per Week: 3 days     Minutes of Exercise per Session: 30 min   Stress: No Stress Concern Present (4/1/2025)    [employer] [employer] - Occupational Stress Questionnaire     Feeling of Stress : Not at all   Housing Stability: Low Risk  (4/1/2025)    Housing Stability Vital Sign     Unable to Pay for Housing in the Last Year: No     Number of Times Moved in the Last Year: 0     Homeless in the Last Year: No     Medications Ordered Prior to Encounter[1]  Review of patient's allergies indicates:  No Known Allergies     Vitals:    04/21/25 0923 04/21/25 0928   BP:  122/72   BP Location: Right arm Left arm   Patient Position: Lying Sitting   Pulse: 65    Resp: 20    SpO2: 99%    Weight: 47.3 kg (104 lb 4.8 oz)    Height: 4' 9.99" (1.473 m)      In general, she is a small, very pleasant, very healthy-appearing nondysmorphic female in no apparent distress.  The eyes, nares, and oropharynx are clear.  Eyelids and conjunctiva are normal without drainage or erythema.  Pupils equal and round bilaterally.  The head is normocephalic and atraumatic.  The neck is supple without jugular venous distention or thyroid enlargement.  The lungs are clear to auscultation bilaterally.  There is a well-healed sternotomy incision.  A mechanical 1st heart sound is normal.  The 2nd heart sound is split.  A grade 1/6 systolic ejection murmur is heard at the upper sternal border.  I do not hear diastolic murmur at the apex.  The abdominal exam is benign without hepatosplenomegaly, tenderness, or distention.  Pulses are normal in all 4 extremities with brisk capillary refill and no clubbing, cyanosis, or edema.  No rashes are noted.    EKG today with normal sinus rhythm, rate 60, RBBB    I " personally reviewed the echo done 4/9/25:  No significant change from last echocardiogram. There is flow acceleration thet starts proximal to the aortic valve. The LVOT/aortic valve velocity is 2.35 m/s with mean gradient 10mmg. RV systolic pressure estimated about 28mmHg greater than the RA pressure. Previously noted left aortic arch. Well repaired coarctation with very mild flow acceleration (2m/s) across the repair. Normal right ventricular systolic function. Dyskinetic basal septum (patch material) with excellent systolic function of the rest of the left ventricle. Overall low normal systolic function with estimated EF around 50-55%. Mild left atrial enlargement. Apical displacement of the septal leaflet of the tricuspid valve with likely moderate insufficiency. The mechanical mitral valve is well seated with peak velocity about 2.1 m/s and a mean pressure gradient of 11mmHg and no significant regurgitation. Intact ventricular septum.     I reviewed that study.  I agree that the TR is moderate and very medial in location.      Cath March 17, 2022:  IMPRESSION:  1) Coarctation s/p repair.  Supravalvar mitral ring resection and primum ASD closure.  Konno for subaortic stenosis with VSD closure.  23mm [facility]. Keith mechanical mitral valve replacement.  2) Normal PA pressure, cardiac output, and vascular resistance calculations.  3) Mean mitral valve gradient 4mmHg  4) Anomalous right subclavian artery.     Cardiac MRI 1/27/22:  Conclusion:    Normal RV volumes with RVEF of 48%  Severe left atrial enlargement.   Well-seated mechanical mitral valve.   The left ventricle is dilated with increased volumes. There is a large VSD patch and paradoxical septal wall motion. Normal LV free wall motion. The calculated LVEF is 43%.   Bicuspid aortic valve with small annulus size. Regurgitant fraction 2%, regurgitant volume 1 cc. Normal velocity of 1.6m/sec. In cine images, there is slight flow aliasing in the subaortic area.   Low  normal/mildly hypoplastic aorta (root, ascending aorta, and arch) with no discrete stenosis (measurements as noted above).   Left aortic arch with unusual branching. 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       Thank you for referring this patient to our clinic.  Please call with any questions.    Sincerely,        Aguilar Olguin MD  Pediatric Cardiology  Adult Congenital Heart Disease  Pediatric Heart Failure and Transplantation  [facility] [address] [place]  [address]  [phone]                     [1]   Current Outpatient Medications on File Prior to Visit   Medication Sig Dispense Refill    SAGAR ASPIRIN ORAL       JANTOVEN 6 mg tablet TAKE ONE (1) TABLET BY MOUTH ONCE DAILY AND ADD ONE HALF (1/2) TABLET ON MONDAYS AND FRIDAYS FOR A TOTAL OF ONE AND A HALF (1 & 1/2) TABS (9MGS) ON MON & FRI (Patient taking differently: Take 6 mg by mouth Daily. Tues takes 9 mg) 34 tablet 6    metoprolol tartrate (LOPRESSOR) 25 MG tablet TAKE ONE TABLET BY MOUTH TWICE A DAY 60 tablet 3     Current Facility-Administered Medications on File Prior to Visit   Medication Dose Route Frequency Provider Last Rate Last Admin    [DISCONTINUED] GENERIC EXTERNAL MEDICATION     Provider, Generic External Data        [DISCONTINUED] GENERIC EXTERNAL MEDICATION     Provider, Generic External Data        [DISCONTINUED] GENERIC EXTERNAL MEDICATION     Provider, Generic External Data

## 2025-04-21 ENCOUNTER — OFFICE VISIT (OUTPATIENT)
Dept: PEDIATRIC CARDIOLOGY | Facility: CLINIC | Age: 30
End: 2025-04-21
Attending: PEDIATRICS
Payer: MEDICAID

## 2025-04-21 VITALS
DIASTOLIC BLOOD PRESSURE: 72 MMHG | OXYGEN SATURATION: 99 % | WEIGHT: 104.31 LBS | RESPIRATION RATE: 20 BRPM | SYSTOLIC BLOOD PRESSURE: 122 MMHG | HEART RATE: 65 BPM | BODY MASS INDEX: 21.89 KG/M2 | HEIGHT: 58 IN

## 2025-04-21 DIAGNOSIS — I50.22 CHRONIC SYSTOLIC HEART FAILURE: ICD-10-CM

## 2025-04-21 DIAGNOSIS — Z87.74 H/O AORTIC COARCTATION REPAIR: ICD-10-CM

## 2025-04-21 DIAGNOSIS — Q24.9 ADULT CONGENITAL HEART DISEASE: Primary | ICD-10-CM

## 2025-04-21 DIAGNOSIS — Q24.4 SUBAORTIC STENOSIS: ICD-10-CM

## 2025-04-21 DIAGNOSIS — Z95.2 H/O MITRAL VALVE REPLACEMENT WITH MECHANICAL VALVE: ICD-10-CM

## 2025-04-21 LAB
OHS QRS DURATION: 176 MS
OHS QTC CALCULATION: 490 MS

## 2025-04-21 PROCEDURE — 3078F DIAST BP <80 MM HG: CPT | Mod: CPTII,S$GLB,, | Performed by: PEDIATRICS

## 2025-04-21 PROCEDURE — 3074F SYST BP LT 130 MM HG: CPT | Mod: CPTII,S$GLB,, | Performed by: PEDIATRICS

## 2025-04-21 PROCEDURE — 99214 OFFICE O/P EST MOD 30 MIN: CPT | Mod: 25,S$GLB,, | Performed by: PEDIATRICS

## 2025-04-21 PROCEDURE — 93000 ELECTROCARDIOGRAM COMPLETE: CPT | Mod: S$GLB,,, | Performed by: PEDIATRICS

## 2025-04-21 PROCEDURE — 3008F BODY MASS INDEX DOCD: CPT | Mod: CPTII,S$GLB,, | Performed by: PEDIATRICS

## 2025-04-30 ENCOUNTER — TELEPHONE (OUTPATIENT)
Dept: PEDIATRIC CARDIOLOGY | Facility: CLINIC | Age: 30
End: 2025-04-30
Payer: MEDICAID

## 2025-04-30 NOTE — TELEPHONE ENCOUNTER
Spoke with patient to schedule ablation procedure. She accepted Friday 8/22/25. Advised instructions will be sent closer to procedure.

## 2025-05-08 ENCOUNTER — PATIENT MESSAGE (OUTPATIENT)
Dept: PEDIATRIC CARDIOLOGY | Facility: CLINIC | Age: 30
End: 2025-05-08
Payer: MEDICAID

## 2025-05-09 RX ORDER — METOPROLOL TARTRATE 25 MG/1
25 TABLET, FILM COATED ORAL 2 TIMES DAILY
Qty: 60 TABLET | Refills: 3 | Status: SHIPPED | OUTPATIENT
Start: 2025-05-09

## 2025-05-15 DIAGNOSIS — Z11.4 ENCOUNTER FOR SCREENING FOR HIV: ICD-10-CM

## 2025-05-15 DIAGNOSIS — Q24.9 ADULT CONGENITAL HEART DISEASE: ICD-10-CM

## 2025-05-15 DIAGNOSIS — Z11.59 NEED FOR HEPATITIS C SCREENING TEST: ICD-10-CM

## 2025-05-15 DIAGNOSIS — Z00.00 WELLNESS EXAMINATION: Primary | ICD-10-CM

## 2025-06-26 RX ORDER — WARFARIN SODIUM 6 MG/1
TABLET ORAL
Qty: 34 TABLET | Refills: 6 | Status: SHIPPED | OUTPATIENT
Start: 2025-06-26

## 2025-07-02 ENCOUNTER — PATIENT MESSAGE (OUTPATIENT)
Dept: ELECTROPHYSIOLOGY | Facility: CLINIC | Age: 30
End: 2025-07-02
Payer: MEDICAID

## 2025-07-02 ENCOUNTER — OFFICE VISIT (OUTPATIENT)
Dept: FAMILY MEDICINE | Facility: CLINIC | Age: 30
End: 2025-07-02
Payer: MEDICAID

## 2025-07-02 ENCOUNTER — LAB VISIT (OUTPATIENT)
Dept: LAB | Facility: HOSPITAL | Age: 30
End: 2025-07-02
Attending: FAMILY MEDICINE
Payer: MEDICAID

## 2025-07-02 VITALS
RESPIRATION RATE: 16 BRPM | WEIGHT: 104.81 LBS | HEART RATE: 55 BPM | HEIGHT: 57 IN | BODY MASS INDEX: 22.61 KG/M2 | TEMPERATURE: 98 F | SYSTOLIC BLOOD PRESSURE: 120 MMHG | OXYGEN SATURATION: 99 % | DIASTOLIC BLOOD PRESSURE: 79 MMHG

## 2025-07-02 DIAGNOSIS — Z00.00 WELLNESS EXAMINATION: ICD-10-CM

## 2025-07-02 DIAGNOSIS — Z00.00 ROUTINE GENERAL MEDICAL EXAMINATION AT A HEALTH CARE FACILITY: Primary | ICD-10-CM

## 2025-07-02 DIAGNOSIS — T63.461A WASP STING, ACCIDENTAL OR UNINTENTIONAL, INITIAL ENCOUNTER: ICD-10-CM

## 2025-07-02 DIAGNOSIS — Q24.9 ADULT CONGENITAL HEART DISEASE: ICD-10-CM

## 2025-07-02 DIAGNOSIS — Z11.59 NEED FOR HEPATITIS C SCREENING TEST: ICD-10-CM

## 2025-07-02 LAB
ALBUMIN SERPL-MCNC: 4.1 G/DL (ref 3.5–5)
ALBUMIN/GLOB SERPL: 1.1 RATIO (ref 1.1–2)
ALP SERPL-CCNC: 99 UNIT/L (ref 40–150)
ALT SERPL-CCNC: 45 UNIT/L (ref 0–55)
ANION GAP SERPL CALC-SCNC: 9 MEQ/L
AST SERPL-CCNC: 37 UNIT/L (ref 11–45)
BASOPHILS # BLD AUTO: 0.02 X10(3)/MCL
BASOPHILS NFR BLD AUTO: 0.6 %
BILIRUB SERPL-MCNC: 0.4 MG/DL
BUN SERPL-MCNC: 7 MG/DL (ref 7–18.7)
CALCIUM SERPL-MCNC: 9.3 MG/DL (ref 8.4–10.2)
CHLORIDE SERPL-SCNC: 107 MMOL/L (ref 98–107)
CHOLEST SERPL-MCNC: 162 MG/DL
CHOLEST/HDLC SERPL: 4 {RATIO} (ref 0–5)
CO2 SERPL-SCNC: 25 MMOL/L (ref 22–29)
CREAT SERPL-MCNC: 0.6 MG/DL (ref 0.55–1.02)
CREAT/UREA NIT SERPL: 12
EOSINOPHIL # BLD AUTO: 0.44 X10(3)/MCL (ref 0–0.9)
EOSINOPHIL NFR BLD AUTO: 13.4 %
ERYTHROCYTE [DISTWIDTH] IN BLOOD BY AUTOMATED COUNT: 12.6 % (ref 11.5–17)
GFR SERPLBLD CREATININE-BSD FMLA CKD-EPI: >60 ML/MIN/1.73/M2
GLOBULIN SER-MCNC: 3.6 GM/DL (ref 2.4–3.5)
GLUCOSE SERPL-MCNC: 82 MG/DL (ref 74–100)
HCT VFR BLD AUTO: 40.6 % (ref 37–47)
HCV AB SERPL QL IA: NONREACTIVE
HDLC SERPL-MCNC: 44 MG/DL (ref 35–60)
HGB BLD-MCNC: 13.1 G/DL (ref 12–16)
IMM GRANULOCYTES # BLD AUTO: 0.01 X10(3)/MCL (ref 0–0.04)
IMM GRANULOCYTES NFR BLD AUTO: 0.3 %
LDLC SERPL CALC-MCNC: 109 MG/DL (ref 50–140)
LYMPHOCYTES # BLD AUTO: 0.83 X10(3)/MCL (ref 0.6–4.6)
LYMPHOCYTES NFR BLD AUTO: 25.3 %
MCH RBC QN AUTO: 29.7 PG (ref 27–31)
MCHC RBC AUTO-ENTMCNC: 32.3 G/DL (ref 33–36)
MCV RBC AUTO: 92.1 FL (ref 80–94)
MONOCYTES # BLD AUTO: 0.35 X10(3)/MCL (ref 0.1–1.3)
MONOCYTES NFR BLD AUTO: 10.7 %
NEUTROPHILS # BLD AUTO: 1.63 X10(3)/MCL (ref 2.1–9.2)
NEUTROPHILS NFR BLD AUTO: 49.7 %
NRBC BLD AUTO-RTO: 0 %
PLATELET # BLD AUTO: 186 X10(3)/MCL (ref 130–400)
PMV BLD AUTO: 12.7 FL (ref 7.4–10.4)
POTASSIUM SERPL-SCNC: 4.2 MMOL/L (ref 3.5–5.1)
PROT SERPL-MCNC: 7.7 GM/DL (ref 6.4–8.3)
RBC # BLD AUTO: 4.41 X10(6)/MCL (ref 4.2–5.4)
SODIUM SERPL-SCNC: 141 MMOL/L (ref 136–145)
TRIGL SERPL-MCNC: 47 MG/DL (ref 37–140)
VLDLC SERPL CALC-MCNC: 9 MG/DL
WBC # BLD AUTO: 3.28 X10(3)/MCL (ref 4.5–11.5)

## 2025-07-02 PROCEDURE — 80061 LIPID PANEL: CPT

## 2025-07-02 PROCEDURE — 85025 COMPLETE CBC W/AUTO DIFF WBC: CPT

## 2025-07-02 PROCEDURE — 80053 COMPREHEN METABOLIC PANEL: CPT

## 2025-07-02 PROCEDURE — 86803 HEPATITIS C AB TEST: CPT

## 2025-07-02 PROCEDURE — 36415 COLL VENOUS BLD VENIPUNCTURE: CPT

## 2025-07-02 NOTE — PROGRESS NOTES
Patient ID: [id number]     Chief Complaint: Follow-up (3 mnt f/u/Wasp sting right foot)    HPI:     Carlie Marcelino is a 29 y.o. female here today for an annual wellness visit.  Wasp sting to dorsal surface of right foot. Has been treating with benadryl. Labs have not resulted by the time of her appointment.     History of Present Illness               -------------------------------------    Aortic stenosis    Coarctation of aorta    Mitral valve stenosis        Past Surgical History:   Procedure Laterality Date    CARDIAC SURGERY      CARDIAC VALVE REPLACEMENT  7/2010    COMBINED RIGHT AND RETROGRADE LEFT HEART CATHETERIZATION FOR CONGENITAL HEART DEFECT N/A 03/17/2022    Procedure: CATHETERIZATION, HEART, COMBINED RIGHT AND RETROGRADE LEFT, FOR CONGENITAL HEART DEFECT;  Surgeon: Sindhu Bellamy MD;  Location: [facility] CATH LAB;  Service: Cardiology;  Laterality: N/A;  Pedi Heart    MITRAL VALVE REPLACEMENT      REPAIR OF COARCTATION OF AORTA      TRANSTHORACIC ECHOCARDIOGRAPHY (TTE)  03/17/2022    Procedure: ECHOCARDIOGRAM, TRANSTHORACIC;  Surgeon: Sindhu Bellamy MD;  Location: [facility] CATH LAB;  Service: Cardiology;;       Review of patient's allergies indicates:  No Known Allergies    Outpatient Medications Marked as Taking for the 7/2/25 encounter (Office Visit) with Wayne Chang, DO   Medication Sig Dispense Refill    SAGAR ASPIRIN ORAL       JANTOVEN 6 mg tablet TAKE ONE (1) TABLET BY MOUTH ONCE DAILY AND ADD ONE HALF (1/2) TABLET ON MONDAYS AND FRIDAYS FOR A TOTAL OF ONE AND A HALF (1 & 1/2) TABS (9MGS) ON MON & FRI 34 tablet 6    metoprolol tartrate (LOPRESSOR) 25 MG tablet TAKE ONE TABLET BY MOUTH TWICE A DAY 60 tablet 3       Social History[1]     Family History   Problem Relation Name Age of Onset    No Known Problems Mother      Heart disease Father Marcell Marcelino Jr     Hypertension Father Marcell Marcelino Jr     Diabetes Father Marcell Marcelino Jr     Miscarriages / Stillbirths Sister  "Hartley Pharris     No Known Problems Brother      No Known Problems Maternal Aunt      No Known Problems Maternal Uncle      No Known Problems Paternal Aunt      No Known Problems Paternal Uncle      No Known Problems Maternal Grandmother      Stroke Maternal Grandfather Bib Grier     No Known Problems Paternal Grandmother      No Known Problems Paternal Grandfather      No Known Problems Other      Anemia Neg Hx      Arrhythmia Neg Hx      Asthma Neg Hx      Clotting disorder Neg Hx      Fainting Neg Hx      Heart attack Neg Hx      Heart failure Neg Hx      Hyperlipidemia Neg Hx      Atrial Septal Defect Neg Hx          Patient Care Team:  Wayne Chang DO as PCP - General (Family Medicine)  Aguilar Olguin MD as Consulting Physician (Pediatric Cardiology)  Andrez Peña MD as Consulting Physician (Pediatric Cardiology)       Subjective:     ROS    See HPI for details  All Other ROS: Negative except as stated in HPI.       Objective:     /79 (BP Location: Left arm, Patient Position: Sitting)   Pulse (!) 55   Temp 97.6 °F (36.4 °C) (Temporal)   Resp 16   Ht 4' 9" (1.448 m)   Wt 47.5 kg (104 lb 12.8 oz)   LMP 06/07/2025 (Approximate)   SpO2 99%   BMI 22.68 kg/m²     Physical Exam  Vitals reviewed.   Constitutional:       General: She is not in acute distress.     Appearance: Normal appearance.   Cardiovascular:      Rate and Rhythm: Normal rate and regular rhythm.      Heart sounds: No murmur heard.     No friction rub. No gallop.   Pulmonary:      Effort: No respiratory distress.      Breath sounds: No wheezing, rhonchi or rales.   Musculoskeletal:         General: Swelling present. No tenderness or deformity.      Right lower leg: No edema.      Left lower leg: No edema.   Skin:     General: Skin is warm and dry.      Findings: Erythema present. No lesion or rash.      Comments: Erythema and swelling of dorsal surface of right foot.    Neurological:      General: No focal deficit present. "      Mental Status: She is alert.   Psychiatric:         Mood and Affect: Mood normal.         Assessment:       ICD-10-CM ICD-9-CM   1. Routine general medical examination at a health care facility  Z00.00 V70.0   2. Wasp sting, accidental or unintentional, initial encounter  T63.461A 989.5     E905.3        Plan:     Assessment & Plan               Health Maintenance Topics with due status: Not Due       Topic Last Completion Date    Influenza Vaccine Not Due    RSV Vaccine (Age 60+ and Pregnant patients) Not Due      Vaccinations -   Immunization History   Administered Date(s) Administered    DTaP 01/23/1996, 05/14/1996, 06/25/1996, 09/18/2000, 03/14/2001    HIB 01/23/1996, 05/14/1996, 06/25/1996    Hepatitis B, Pediatric/Adolescent 1995, 01/23/1996, 05/14/1996    IPV 01/23/1996, 05/14/1996, 06/25/1996, 09/18/2000, 03/14/2001    MMR 09/18/2000, 03/14/2001    Tdap 06/27/2007    Varicella 09/18/2000        1. Routine general medical examination at a health care facility    2. Wasp sting, accidental or unintentional, initial encounter  -recommended topical benadryl or Calamine lotion for itching. Continue Oral benadryl. Topical hydrocortisone also recommended.     Medication List with Changes/Refills   Current Medications    SAGAR ASPIRIN ORAL           Start Date: --        End Date: --    JANTOVEN 6 MG TABLET    TAKE ONE (1) TABLET BY MOUTH ONCE DAILY AND ADD ONE HALF (1/2) TABLET ON MONDAYS AND FRIDAYS FOR A TOTAL OF ONE AND A HALF (1 & 1/2) TABS (9MGS) ON MON & FRI       Start Date: 6/26/2025 End Date: --    METOPROLOL TARTRATE (LOPRESSOR) 25 MG TABLET    TAKE ONE TABLET BY MOUTH TWICE A DAY       Start Date: 5/9/2025  End Date: --        The patient's Health Maintenance was reviewed and the following appears to be due at this time:   Health Maintenance Due   Topic Date Due    Pap Smear  Never done    TETANUS VACCINE  06/27/2017     This note was generated with the assistance of ambient listening  technology. Verbal consent was obtained by the patient and accompanying visitor(s) for the recording of patient appointment to facilitate this note. I attest to having reviewed and edited the generated note for accuracy, though some syntax or spelling errors may persist. Please contact the author of this note for any clarification.       Follow up in about 6 months (around 1/2/2026) for Follow up chronic conditions . In addition to their scheduled follow up, the patient has also been instructed to follow up on as needed basis.          [1]   Social History  Socioeconomic History    Marital status: Single   Tobacco Use    Smoking status: Never     Passive exposure: Never    Smokeless tobacco: Never   Substance and Sexual Activity    Alcohol use: Never    Drug use: Never    Sexual activity: Not Currently     Partners: Male     Birth control/protection: Condom     Social Drivers of Health     Financial Resource Strain: Low Risk  (4/1/2025)    Overall Financial Resource Strain (CARDIA)     Difficulty of Paying Living Expenses: Not hard at all   Food Insecurity: No Food Insecurity (4/1/2025)    Hunger Vital Sign     Worried About Running Out of Food in the Last Year: Never true     Ran Out of Food in the Last Year: Never true   Transportation Needs: No Transportation Needs (4/1/2025)    PRAPARE - Transportation     Lack of Transportation (Medical): No     Lack of Transportation (Non-Medical): No   Physical Activity: Insufficiently Active (4/1/2025)    Exercise Vital Sign     Days of Exercise per Week: 3 days     Minutes of Exercise per Session: 30 min   Stress: No Stress Concern Present (4/1/2025)    [employer] - Occupational Stress Questionnaire     Feeling of Stress : Not at all   Housing Stability: Low Risk  (4/1/2025)    Housing Stability Vital Sign     Unable to Pay for Housing in the Last Year: No     Number of Times Moved in the Last Year: 0     Homeless in the Last Year: No

## 2025-07-10 NOTE — TELEPHONE ENCOUNTER
Andrew will hand Carlie her lab orders at her next INR finger stick on Dec 1. Carlie will go to [facility] herself for blood work, [facility] is unable to process lab request with out a [facility] account number. Carlie verbalized understanding all information provided. Lab orders also mailed to the confirmed address on file.     ----- Message from Char Francisco MA sent at 11/28/2023  8:12 AM CST -----  Contact: [phone]  Andrew ([facility] called                In regards to needing to speak with provider or staff about blood work.                Call back [phone]    
[phone]

## 2025-07-16 ENCOUNTER — TELEPHONE (OUTPATIENT)
Dept: FAMILY MEDICINE | Facility: CLINIC | Age: 30
End: 2025-07-16
Payer: MEDICAID

## 2025-07-16 NOTE — TELEPHONE ENCOUNTER
----- Message from Wayne Chang DO sent at 7/16/2025  1:41 PM CDT -----  All lab results within acceptable ranges.  ----- Message -----  From: Lab, Background User  Sent: 7/2/2025  10:47 AM CDT  To: Wayne Chang DO

## 2025-08-11 ENCOUNTER — TELEPHONE (OUTPATIENT)
Dept: PEDIATRIC CARDIOLOGY | Facility: CLINIC | Age: 30
End: 2025-08-11
Payer: MEDICAID

## 2025-08-11 ENCOUNTER — PATIENT MESSAGE (OUTPATIENT)
Dept: CARDIOLOGY | Facility: CLINIC | Age: 30
End: 2025-08-11
Payer: MEDICAID

## 2025-08-12 ENCOUNTER — TELEPHONE (OUTPATIENT)
Dept: PEDIATRIC CARDIOLOGY | Facility: CLINIC | Age: 30
End: 2025-08-12
Payer: MEDICAID

## 2025-08-21 ENCOUNTER — TELEPHONE (OUTPATIENT)
Dept: ELECTROPHYSIOLOGY | Facility: CLINIC | Age: 30
End: 2025-08-21
Payer: MEDICAID

## 2025-08-21 ENCOUNTER — ANESTHESIA EVENT (OUTPATIENT)
Dept: MEDSURG UNIT | Facility: HOSPITAL | Age: 30
End: 2025-08-21
Payer: MEDICAID

## 2025-08-22 ENCOUNTER — HOSPITAL ENCOUNTER (OUTPATIENT)
Facility: HOSPITAL | Age: 30
Discharge: HOME | End: 2025-08-23
Attending: INTERNAL MEDICINE | Admitting: INTERNAL MEDICINE
Payer: MEDICAID

## 2025-08-22 ENCOUNTER — ANESTHESIA (OUTPATIENT)
Dept: MEDSURG UNIT | Facility: HOSPITAL | Age: 30
End: 2025-08-22
Payer: MEDICAID

## 2025-08-22 DIAGNOSIS — I49.9 ARRHYTHMIA: ICD-10-CM

## 2025-08-22 DIAGNOSIS — I48.92 ATRIAL FLUTTER: ICD-10-CM

## 2025-08-22 DIAGNOSIS — Z95.2 H/O MITRAL VALVE REPLACEMENT WITH MECHANICAL VALVE: Primary | ICD-10-CM

## 2025-08-22 DIAGNOSIS — I48.4 ATYPICAL ATRIAL FLUTTER: ICD-10-CM

## 2025-08-22 LAB
ABSOLUTE EOSINOPHIL (OHS): 0.1 K/UL
ABSOLUTE MONOCYTE (OHS): 0.36 K/UL (ref 0.3–1)
ABSOLUTE NEUTROPHIL COUNT (OHS): 3.75 K/UL (ref 1.8–7.7)
APTT PPP: 36.3 SECONDS (ref 21–32)
APTT PPP: 41.9 SECONDS (ref 21–32)
APTT PPP: >150 SECONDS (ref 21–32)
B-HCG UR QL: NEGATIVE
BASOPHILS # BLD AUTO: 0.02 K/UL
BASOPHILS NFR BLD AUTO: 0.4 %
BSA FOR ECHO PROCEDURE: 1.38 M2
CTP QC/QA: YES
ERYTHROCYTE [DISTWIDTH] IN BLOOD BY AUTOMATED COUNT: 13 % (ref 11.5–14.5)
HCT VFR BLD AUTO: 35.3 % (ref 37–48.5)
HGB BLD-MCNC: 11.6 GM/DL (ref 12–16)
IMM GRANULOCYTES # BLD AUTO: 0.04 K/UL (ref 0–0.04)
IMM GRANULOCYTES NFR BLD AUTO: 0.8 % (ref 0–0.5)
INR PPP: 1.6 (ref 0.8–1.2)
INR PPP: 2 (ref 0.8–1.2)
INR PPP: 2.2 (ref 0.8–1.2)
LYMPHOCYTES # BLD AUTO: 0.64 K/UL (ref 1–4.8)
MCH RBC QN AUTO: 30.1 PG (ref 27–31)
MCHC RBC AUTO-ENTMCNC: 32.9 G/DL (ref 32–36)
MCV RBC AUTO: 92 FL (ref 82–98)
NUCLEATED RBC (/100WBC) (OHS): 0 /100 WBC
OHS CV CPX PATIENT HEIGHT IN: 57
OHS CV CPX PATIENT HEIGHT IN: 57
OHS QRS DURATION: 160 MS
OHS QRS DURATION: 160 MS
OHS QTC CALCULATION: 500 MS
OHS QTC CALCULATION: 522 MS
PLATELET # BLD AUTO: 137 K/UL (ref 150–450)
PMV BLD AUTO: 11 FL (ref 9.2–12.9)
PROTHROMBIN TIME: 17.2 SECONDS (ref 9–12.5)
PROTHROMBIN TIME: 21.3 SECONDS (ref 9–12.5)
PROTHROMBIN TIME: 22.9 SECONDS (ref 9–12.5)
RBC # BLD AUTO: 3.86 M/UL (ref 4–5.4)
RELATIVE EOSINOPHIL (OHS): 2 %
RELATIVE LYMPHOCYTE (OHS): 13 % (ref 18–48)
RELATIVE MONOCYTE (OHS): 7.3 % (ref 4–15)
RELATIVE NEUTROPHIL (OHS): 76.5 % (ref 38–73)
WBC # BLD AUTO: 4.91 K/UL (ref 3.9–12.7)

## 2025-08-22 PROCEDURE — 85610 PROTHROMBIN TIME: CPT

## 2025-08-22 PROCEDURE — C1766 INTRO/SHEATH,STRBLE,NON-PEEL: HCPCS | Performed by: INTERNAL MEDICINE

## 2025-08-22 PROCEDURE — C1753 CATH, INTRAVAS ULTRASOUND: HCPCS | Performed by: INTERNAL MEDICINE

## 2025-08-22 PROCEDURE — 85347 COAGULATION TIME ACTIVATED: CPT | Performed by: INTERNAL MEDICINE

## 2025-08-22 PROCEDURE — 27201423 OPTIME MED/SURG SUP & DEVICES STERILE SUPPLY: Performed by: INTERNAL MEDICINE

## 2025-08-22 PROCEDURE — 85730 THROMBOPLASTIN TIME PARTIAL: CPT | Mod: 91 | Performed by: INTERNAL MEDICINE

## 2025-08-22 PROCEDURE — 93010 ELECTROCARDIOGRAM REPORT: CPT | Mod: ,,, | Performed by: INTERNAL MEDICINE

## 2025-08-22 PROCEDURE — 81025 URINE PREGNANCY TEST: CPT

## 2025-08-22 PROCEDURE — 36415 COLL VENOUS BLD VENIPUNCTURE: CPT | Performed by: INTERNAL MEDICINE

## 2025-08-22 PROCEDURE — 51702 INSERT TEMP BLADDER CATH: CPT

## 2025-08-22 PROCEDURE — 85610 PROTHROMBIN TIME: CPT | Mod: 91 | Performed by: NURSE PRACTITIONER

## 2025-08-22 PROCEDURE — 85025 COMPLETE CBC W/AUTO DIFF WBC: CPT | Performed by: NURSE PRACTITIONER

## 2025-08-22 PROCEDURE — C1894 INTRO/SHEATH, NON-LASER: HCPCS | Performed by: INTERNAL MEDICINE

## 2025-08-22 PROCEDURE — 85610 PROTHROMBIN TIME: CPT | Mod: 91 | Performed by: INTERNAL MEDICINE

## 2025-08-22 PROCEDURE — 85730 THROMBOPLASTIN TIME PARTIAL: CPT | Mod: 91 | Performed by: NURSE PRACTITIONER

## 2025-08-22 PROCEDURE — C1732 CATH, EP, DIAG/ABL, 3D/VECT: HCPCS | Performed by: INTERNAL MEDICINE

## 2025-08-22 PROCEDURE — C1730 CATH, EP, 19 OR FEW ELECT: HCPCS | Performed by: INTERNAL MEDICINE

## 2025-08-22 PROCEDURE — 37000008 HC ANESTHESIA 1ST 15 MINUTES: Performed by: INTERNAL MEDICINE

## 2025-08-22 PROCEDURE — 37000009 HC ANESTHESIA EA ADD 15 MINS: Performed by: INTERNAL MEDICINE

## 2025-08-22 PROCEDURE — 93005 ELECTROCARDIOGRAM TRACING: CPT | Performed by: INTERNAL MEDICINE

## 2025-08-22 PROCEDURE — 93653 COMPRE EP EVAL TX SVT: CPT | Performed by: INTERNAL MEDICINE

## 2025-08-22 PROCEDURE — 63600175 PHARM REV CODE 636 W HCPCS

## 2025-08-22 PROCEDURE — 25000003 PHARM REV CODE 250

## 2025-08-22 PROCEDURE — C2630 CATH, EP, COOL-TIP: HCPCS | Performed by: INTERNAL MEDICINE

## 2025-08-22 PROCEDURE — 25000003 PHARM REV CODE 250: Performed by: NURSE PRACTITIONER

## 2025-08-22 PROCEDURE — 63600175 PHARM REV CODE 636 W HCPCS: Performed by: NURSE PRACTITIONER

## 2025-08-22 PROCEDURE — 93653 COMPRE EP EVAL TX SVT: CPT | Mod: 22,,, | Performed by: INTERNAL MEDICINE

## 2025-08-22 PROCEDURE — 94761 N-INVAS EAR/PLS OXIMETRY MLT: CPT

## 2025-08-22 PROCEDURE — 93005 ELECTROCARDIOGRAM TRACING: CPT

## 2025-08-22 PROCEDURE — 63600175 PHARM REV CODE 636 W HCPCS: Performed by: INTERNAL MEDICINE

## 2025-08-22 PROCEDURE — 85730 THROMBOPLASTIN TIME PARTIAL: CPT

## 2025-08-22 RX ORDER — ACETAMINOPHEN 325 MG/1
650 TABLET ORAL EVERY 4 HOURS PRN
Status: DISCONTINUED | OUTPATIENT
Start: 2025-08-22 | End: 2025-08-23 | Stop reason: HOSPADM

## 2025-08-22 RX ORDER — PHENYLEPHRINE HYDROCHLORIDE 10 MG/ML
INJECTION INTRAVENOUS
Status: DISCONTINUED | OUTPATIENT
Start: 2025-08-22 | End: 2025-08-22

## 2025-08-22 RX ORDER — PROCHLORPERAZINE EDISYLATE 5 MG/ML
5 INJECTION INTRAMUSCULAR; INTRAVENOUS EVERY 30 MIN PRN
Status: DISCONTINUED | OUTPATIENT
Start: 2025-08-22 | End: 2025-08-23

## 2025-08-22 RX ORDER — CEFAZOLIN SODIUM 1 G/3ML
INJECTION, POWDER, FOR SOLUTION INTRAMUSCULAR; INTRAVENOUS
Status: DISCONTINUED | OUTPATIENT
Start: 2025-08-22 | End: 2025-08-22

## 2025-08-22 RX ORDER — CEFAZOLIN 2 G/1
2 INJECTION, POWDER, FOR SOLUTION INTRAMUSCULAR; INTRAVENOUS
Status: DISCONTINUED | OUTPATIENT
Start: 2025-08-22 | End: 2025-08-22 | Stop reason: HOSPADM

## 2025-08-22 RX ORDER — WARFARIN 3 MG/1
6 TABLET ORAL DAILY
Status: DISCONTINUED | OUTPATIENT
Start: 2025-08-22 | End: 2025-08-23

## 2025-08-22 RX ORDER — DIPHENHYDRAMINE HYDROCHLORIDE 50 MG/ML
25 INJECTION, SOLUTION INTRAMUSCULAR; INTRAVENOUS EVERY 6 HOURS PRN
Status: DISCONTINUED | OUTPATIENT
Start: 2025-08-22 | End: 2025-08-22

## 2025-08-22 RX ORDER — METOPROLOL TARTRATE 25 MG/1
25 TABLET, FILM COATED ORAL 2 TIMES DAILY
Status: DISCONTINUED | OUTPATIENT
Start: 2025-08-22 | End: 2025-08-23 | Stop reason: HOSPADM

## 2025-08-22 RX ORDER — FENTANYL CITRATE 50 UG/ML
25 INJECTION, SOLUTION INTRAMUSCULAR; INTRAVENOUS EVERY 5 MIN PRN
Status: DISCONTINUED | OUTPATIENT
Start: 2025-08-22 | End: 2025-08-23

## 2025-08-22 RX ORDER — FENTANYL CITRATE 50 UG/ML
INJECTION, SOLUTION INTRAMUSCULAR; INTRAVENOUS
Status: DISCONTINUED | OUTPATIENT
Start: 2025-08-22 | End: 2025-08-22

## 2025-08-22 RX ORDER — HEPARIN SOD,PORCINE/0.9 % NACL 1000/500ML
INTRAVENOUS SOLUTION INTRAVENOUS
Status: DISCONTINUED | OUTPATIENT
Start: 2025-08-22 | End: 2025-08-22

## 2025-08-22 RX ORDER — LIDOCAINE HYDROCHLORIDE 20 MG/ML
INJECTION, SOLUTION EPIDURAL; INFILTRATION; INTRACAUDAL; PERINEURAL
Status: DISCONTINUED | OUTPATIENT
Start: 2025-08-22 | End: 2025-08-22

## 2025-08-22 RX ORDER — ROCURONIUM BROMIDE 10 MG/ML
INJECTION, SOLUTION INTRAVENOUS
Status: DISCONTINUED | OUTPATIENT
Start: 2025-08-22 | End: 2025-08-22

## 2025-08-22 RX ORDER — MIDAZOLAM HYDROCHLORIDE 1 MG/ML
INJECTION INTRAMUSCULAR; INTRAVENOUS
Status: DISCONTINUED | OUTPATIENT
Start: 2025-08-22 | End: 2025-08-22

## 2025-08-22 RX ORDER — HEPARIN SODIUM,PORCINE/D5W 25000/250
0-40 INTRAVENOUS SOLUTION INTRAVENOUS CONTINUOUS
Status: DISCONTINUED | OUTPATIENT
Start: 2025-08-22 | End: 2025-08-23

## 2025-08-22 RX ORDER — PROTAMINE SULFATE 10 MG/ML
INJECTION, SOLUTION INTRAVENOUS
Status: DISCONTINUED | OUTPATIENT
Start: 2025-08-22 | End: 2025-08-22

## 2025-08-22 RX ORDER — LIDOCAINE HYDROCHLORIDE 20 MG/ML
INJECTION, SOLUTION INFILTRATION; PERINEURAL
Status: DISCONTINUED | OUTPATIENT
Start: 2025-08-22 | End: 2025-08-22

## 2025-08-22 RX ORDER — HEPARIN SODIUM 1000 [USP'U]/ML
INJECTION, SOLUTION INTRAVENOUS; SUBCUTANEOUS
Status: DISCONTINUED | OUTPATIENT
Start: 2025-08-22 | End: 2025-08-22

## 2025-08-22 RX ORDER — HEPARIN SODIUM 10000 [USP'U]/100ML
INJECTION, SOLUTION INTRAVENOUS CONTINUOUS PRN
Status: DISCONTINUED | OUTPATIENT
Start: 2025-08-22 | End: 2025-08-22

## 2025-08-22 RX ORDER — ONDANSETRON HYDROCHLORIDE 2 MG/ML
4 INJECTION, SOLUTION INTRAVENOUS ONCE AS NEEDED
Status: DISCONTINUED | OUTPATIENT
Start: 2025-08-22 | End: 2025-08-23

## 2025-08-22 RX ORDER — PROPOFOL 10 MG/ML
VIAL (ML) INTRAVENOUS
Status: DISCONTINUED | OUTPATIENT
Start: 2025-08-22 | End: 2025-08-22

## 2025-08-22 RX ORDER — ONDANSETRON HYDROCHLORIDE 2 MG/ML
INJECTION, SOLUTION INTRAVENOUS
Status: DISCONTINUED | OUTPATIENT
Start: 2025-08-22 | End: 2025-08-22

## 2025-08-22 RX ORDER — HYDROMORPHONE HYDROCHLORIDE 1 MG/ML
0.2 INJECTION, SOLUTION INTRAMUSCULAR; INTRAVENOUS; SUBCUTANEOUS EVERY 5 MIN PRN
Status: DISCONTINUED | OUTPATIENT
Start: 2025-08-22 | End: 2025-08-23

## 2025-08-22 RX ADMIN — SODIUM CHLORIDE: 9 INJECTION, SOLUTION INTRAVENOUS at 07:08

## 2025-08-22 RX ADMIN — PHENYLEPHRINE HYDROCHLORIDE 0.2 MCG/KG/MIN: 10 INJECTION INTRAVENOUS at 07:08

## 2025-08-22 RX ADMIN — HEPARIN SODIUM 1000 UNITS: 1000 INJECTION, SOLUTION INTRAVENOUS; SUBCUTANEOUS at 10:08

## 2025-08-22 RX ADMIN — SUGAMMADEX 200 MG: 100 INJECTION, SOLUTION INTRAVENOUS at 10:08

## 2025-08-22 RX ADMIN — HEPARIN SODIUM 3000 UNITS: 1000 INJECTION, SOLUTION INTRAVENOUS; SUBCUTANEOUS at 08:08

## 2025-08-22 RX ADMIN — LIDOCAINE HYDROCHLORIDE 40 MG: 20 INJECTION, SOLUTION EPIDURAL; INFILTRATION; INTRACAUDAL; PERINEURAL at 07:08

## 2025-08-22 RX ADMIN — WARFARIN SODIUM 6 MG: 3 TABLET ORAL at 05:08

## 2025-08-22 RX ADMIN — MIDAZOLAM HYDROCHLORIDE 2 MG: 2 INJECTION, SOLUTION INTRAMUSCULAR; INTRAVENOUS at 07:08

## 2025-08-22 RX ADMIN — FENTANYL CITRATE 100 MCG: 50 INJECTION, SOLUTION INTRAMUSCULAR; INTRAVENOUS at 07:08

## 2025-08-22 RX ADMIN — ROCURONIUM BROMIDE 50 MG: 10 INJECTION INTRAVENOUS at 07:08

## 2025-08-22 RX ADMIN — METOPROLOL TARTRATE 25 MG: 25 TABLET, FILM COATED ORAL at 09:08

## 2025-08-22 RX ADMIN — CEFAZOLIN 2 G: 330 INJECTION, POWDER, FOR SOLUTION INTRAMUSCULAR; INTRAVENOUS at 07:08

## 2025-08-22 RX ADMIN — PROTAMINE SULFATE 35 MG: 10 INJECTION, SOLUTION INTRAVENOUS at 10:08

## 2025-08-22 RX ADMIN — ROCURONIUM BROMIDE 20 MG: 10 INJECTION INTRAVENOUS at 08:08

## 2025-08-22 RX ADMIN — PROPOFOL 100 MG: 10 INJECTION, EMULSION INTRAVENOUS at 07:08

## 2025-08-22 RX ADMIN — HEPARIN SODIUM 7000 UNITS: 1000 INJECTION, SOLUTION INTRAVENOUS; SUBCUTANEOUS at 08:08

## 2025-08-22 RX ADMIN — ONDANSETRON 4 MG: 2 INJECTION INTRAMUSCULAR; INTRAVENOUS at 10:08

## 2025-08-22 RX ADMIN — HEPARIN SODIUM AND DEXTROSE 12 UNITS/KG/HR: 10000; 5 INJECTION INTRAVENOUS at 05:08

## 2025-08-22 RX ADMIN — HEPARIN SODIUM 12 UNITS/KG/HR: 10000 INJECTION, SOLUTION INTRAVENOUS at 08:08

## 2025-08-23 VITALS
OXYGEN SATURATION: 96 % | DIASTOLIC BLOOD PRESSURE: 62 MMHG | SYSTOLIC BLOOD PRESSURE: 108 MMHG | TEMPERATURE: 98 F | RESPIRATION RATE: 17 BRPM | HEIGHT: 58 IN | BODY MASS INDEX: 21.75 KG/M2 | WEIGHT: 103.63 LBS | HEART RATE: 84 BPM

## 2025-08-23 LAB
ABSOLUTE EOSINOPHIL (OHS): 0.17 K/UL
ABSOLUTE MONOCYTE (OHS): 0.49 K/UL (ref 0.3–1)
ABSOLUTE NEUTROPHIL COUNT (OHS): 4.43 K/UL (ref 1.8–7.7)
APTT PPP: 64.4 SECONDS (ref 21–32)
APTT PPP: 80 SECONDS (ref 21–32)
BASOPHILS # BLD AUTO: 0.03 K/UL
BASOPHILS NFR BLD AUTO: 0.5 %
ERYTHROCYTE [DISTWIDTH] IN BLOOD BY AUTOMATED COUNT: 13.1 % (ref 11.5–14.5)
HCT VFR BLD AUTO: 33.3 % (ref 37–48.5)
HGB BLD-MCNC: 10.8 GM/DL (ref 12–16)
IMM GRANULOCYTES # BLD AUTO: 0.01 K/UL (ref 0–0.04)
IMM GRANULOCYTES NFR BLD AUTO: 0.2 % (ref 0–0.5)
INR PPP: 3.4 (ref 0.8–1.2)
LYMPHOCYTES # BLD AUTO: 0.72 K/UL (ref 1–4.8)
MCH RBC QN AUTO: 30.2 PG (ref 27–31)
MCHC RBC AUTO-ENTMCNC: 32.4 G/DL (ref 32–36)
MCV RBC AUTO: 93 FL (ref 82–98)
NUCLEATED RBC (/100WBC) (OHS): 0 /100 WBC
PLATELET # BLD AUTO: 140 K/UL (ref 150–450)
PMV BLD AUTO: 12.2 FL (ref 9.2–12.9)
PROTHROMBIN TIME: 34 SECONDS (ref 9–12.5)
RBC # BLD AUTO: 3.58 M/UL (ref 4–5.4)
RELATIVE EOSINOPHIL (OHS): 2.9 %
RELATIVE LYMPHOCYTE (OHS): 12.3 % (ref 18–48)
RELATIVE MONOCYTE (OHS): 8.4 % (ref 4–15)
RELATIVE NEUTROPHIL (OHS): 75.7 % (ref 38–73)
WBC # BLD AUTO: 5.85 K/UL (ref 3.9–12.7)

## 2025-08-23 PROCEDURE — 85610 PROTHROMBIN TIME: CPT | Performed by: NURSE PRACTITIONER

## 2025-08-23 PROCEDURE — 25000003 PHARM REV CODE 250: Performed by: NURSE PRACTITIONER

## 2025-08-23 PROCEDURE — 36415 COLL VENOUS BLD VENIPUNCTURE: CPT | Performed by: NURSE PRACTITIONER

## 2025-08-23 PROCEDURE — 85730 THROMBOPLASTIN TIME PARTIAL: CPT | Performed by: INTERNAL MEDICINE

## 2025-08-23 PROCEDURE — 85025 COMPLETE CBC W/AUTO DIFF WBC: CPT | Performed by: NURSE PRACTITIONER

## 2025-08-23 RX ADMIN — METOPROLOL TARTRATE 25 MG: 25 TABLET, FILM COATED ORAL at 08:08

## 2025-08-25 ENCOUNTER — TELEPHONE (OUTPATIENT)
Dept: PEDIATRIC CARDIOLOGY | Facility: CLINIC | Age: 30
End: 2025-08-25
Payer: MEDICAID

## 2025-08-25 ENCOUNTER — PATIENT MESSAGE (OUTPATIENT)
Facility: CLINIC | Age: 30
End: 2025-08-25
Payer: MEDICAID

## 2025-08-26 ENCOUNTER — PATIENT OUTREACH (OUTPATIENT)
Facility: CLINIC | Age: 30
End: 2025-08-26
Payer: MEDICAID

## 2025-08-26 LAB
POC ACTIVATED CLOTTING TIME K: 164 SEC (ref 74–137)
POC ACTIVATED CLOTTING TIME K: 297 SEC (ref 74–137)
POC ACTIVATED CLOTTING TIME K: 343 SEC (ref 74–137)
POC ACTIVATED CLOTTING TIME K: 366 SEC (ref 74–137)
POC ACTIVATED CLOTTING TIME K: 383 SEC (ref 74–137)
SAMPLE: ABNORMAL

## (undated) DEVICE — KIT PROBE COVER WITH GEL

## (undated) DEVICE — INTRODUCER HEMOSTASIS 7.5F

## (undated) DEVICE — ELECTRODE REM PLYHSV RETURN 9

## (undated) DEVICE — SET TUBING COOL POINT IRR

## (undated) DEVICE — COVER BAND BAG 40 X 40

## (undated) DEVICE — R CATH ACUSON ACUNAV 8FR

## (undated) DEVICE — KIT GLIDESHEATH SLEND 6FR 10CM

## (undated) DEVICE — PAD DEFIB CADENCE ADULT R2

## (undated) DEVICE — BASIN SPLASH SHLD W/PAD BLUE

## (undated) DEVICE — CATH TACTFLEX SE BID CURVE F-J

## (undated) DEVICE — DILATOR SJM VESSEL 12F 19CM

## (undated) DEVICE — SHEATH HEMOSTASIS 8.5FR

## (undated) DEVICE — KIT ENSITE ELECTRODE SURFACE

## (undated) DEVICE — COVER BAND BAG 28 X 12

## (undated) DEVICE — PACK EP DRAPE OMC

## (undated) DEVICE — WIRE BENTSON 035/180

## (undated) DEVICE — Device

## (undated) DEVICE — R CATH BIDIRECTIONL DF CRV 7FR

## (undated) DEVICE — GLIDESHEATH SLENDER SS 5FR10CM

## (undated) DEVICE — PAD GROUND UNIV STYLE CORD 9IN

## (undated) DEVICE — KIT CUSTOM MANIFOLD

## (undated) DEVICE — CATH MONGOOSE PIGTAIL 4F 80CM

## (undated) DEVICE — CATH WEDGE 6FR X 110CM

## (undated) DEVICE — HEMOSTAT VASC BAND REG 24CM

## (undated) DEVICE — TRAY CATH 1-LYR URIMTR 16FR

## (undated) DEVICE — GUIDEWIRE EMERALD 150CM PTFE

## (undated) DEVICE — VISE RADIFOCUS MULTI TORQUE

## (undated) DEVICE — GUIDEWIRE TIP-DEFL .035X145CM

## (undated) DEVICE — PROTECTION STATION PLUS

## (undated) DEVICE — CATH ADVISOR HD GRID X SENSOR

## (undated) DEVICE — LINE PRESSURE MONITORING 96IN

## (undated) DEVICE — STOPCOCK 3-WAY

## (undated) DEVICE — OMNIPAQUE 350 200ML

## (undated) DEVICE — SPIKE CONTRAST CONTROLLER

## (undated) DEVICE — INTRO AGILIS MED CRL 8.5F 71CM

## (undated) DEVICE — PACK PEDIATRIC ANGIOGRAPHY

## (undated) DEVICE — GUIDEWIRE STF .035X180CM ANG

## (undated) DEVICE — CATH ANGLED GLIDE CATH 4FR

## (undated) DEVICE — COVER PRB TRNSDUC 7.6X183CM

## (undated) DEVICE — LINE 60IN PRESSURE MON.

## (undated) DEVICE — INTRO SWARTZ SL2 8.5FR 63CM